# Patient Record
Sex: FEMALE | Race: WHITE | NOT HISPANIC OR LATINO | Employment: UNEMPLOYED | URBAN - METROPOLITAN AREA
[De-identification: names, ages, dates, MRNs, and addresses within clinical notes are randomized per-mention and may not be internally consistent; named-entity substitution may affect disease eponyms.]

---

## 2021-06-17 ENCOUNTER — TRANSFERRED RECORDS (OUTPATIENT)
Dept: HEALTH INFORMATION MANAGEMENT | Facility: CLINIC | Age: 46
End: 2021-06-17

## 2021-07-06 ENCOUNTER — TRANSFERRED RECORDS (OUTPATIENT)
Dept: HEALTH INFORMATION MANAGEMENT | Facility: CLINIC | Age: 46
End: 2021-07-06

## 2022-11-08 ENCOUNTER — TRANSFERRED RECORDS (OUTPATIENT)
Dept: HEALTH INFORMATION MANAGEMENT | Facility: CLINIC | Age: 47
End: 2022-11-08

## 2023-01-09 LAB
ALT SERPL-CCNC: 17 U/L (ref 4–35)
AST SERPL-CCNC: 20 U/L (ref 14–41)
CREATININE (EXTERNAL): 0.7 MG/DL (ref 0.52–1.04)
GFR ESTIMATED (EXTERNAL): 90 ML/MIN/1.73M2 (ref 61–166)
GLUCOSE (EXTERNAL): 97 MG/DL (ref 74–106)
POTASSIUM (EXTERNAL): 4.1 MMOL/L (ref 3.5–5.1)

## 2023-03-31 LAB
ALT SERPL-CCNC: 25 U/L (ref 4–35)
AST SERPL-CCNC: 19 U/L (ref 14–41)
CREATININE (EXTERNAL): 0.6 MG/DL (ref 0.52–1.04)
GFR ESTIMATED (EXTERNAL): 107 ML/MIN/1.73M2 (ref 61–166)
GLUCOSE (EXTERNAL): 98 MG/DL (ref 74–106)
POTASSIUM (EXTERNAL): 4.2 MMOL/L (ref 3.5–5.1)

## 2023-06-23 LAB
ALT SERPL-CCNC: 18 U/L (ref 4–35)
AST SERPL-CCNC: 18 U/L (ref 14–41)
CREATININE (EXTERNAL): 0.7 MG/DL (ref 0.52–1.04)
GFR ESTIMATED (EXTERNAL): 90 ML/MIN/1.73M2 (ref 61–166)
GLUCOSE (EXTERNAL): 100 MG/DL (ref 74–106)
POTASSIUM (EXTERNAL): 4 MMOL/L (ref 3.5–5.1)

## 2023-06-26 ENCOUNTER — TRANSFERRED RECORDS (OUTPATIENT)
Dept: HEALTH INFORMATION MANAGEMENT | Facility: CLINIC | Age: 48
End: 2023-06-26

## 2023-07-17 ENCOUNTER — TRANSFERRED RECORDS (OUTPATIENT)
Dept: HEALTH INFORMATION MANAGEMENT | Facility: CLINIC | Age: 48
End: 2023-07-17

## 2023-09-29 ENCOUNTER — TRANSFERRED RECORDS (OUTPATIENT)
Dept: HEALTH INFORMATION MANAGEMENT | Facility: CLINIC | Age: 48
End: 2023-09-29

## 2023-09-29 LAB
ALT SERPL-CCNC: 19 U/L (ref 4–35)
AST SERPL-CCNC: 19 U/L (ref 14–41)
CREATININE (EXTERNAL): 0.7 MG/DL (ref 0.52–1.04)
GFR ESTIMATED (EXTERNAL): 90 ML/MIN/1.73M2 (ref 61–166)
GLUCOSE (EXTERNAL): 116 MG/DL (ref 74–106)
POTASSIUM (EXTERNAL): 4.1 MMOL/L (ref 3.5–5.1)

## 2023-10-03 ENCOUNTER — TRANSFERRED RECORDS (OUTPATIENT)
Dept: HEALTH INFORMATION MANAGEMENT | Facility: CLINIC | Age: 48
End: 2023-10-03

## 2023-12-13 NOTE — PROGRESS NOTES
"Patient establishing care moving from [place]. Did not transfer any information over here yet or transfer oncology care.      Assessment & Plan     Malignant neoplasm of right female breast, unspecified estrogen receptor status, unspecified site of breast (H)  2 years since diagnosis   - MA Diagnostic Digital Bilateral; Future  - US Breast Bilateral Limited 1-3 Quadrants; Future  - Adult Oncology/Hematology  Referral; Future  Lumpectomy with chemo/rad treatment per patient. Unable to view subtype of cancer vs complete plan without notes although on tamoxifen x 5 years.  Will refer to oncology to get set up with new team in [place]  Set up for repeat mammo plus US in meantime    Screen for colon cancer  Due for screen  - Colonoscopy Screening  Referral; Future    Moderate episode of recurrent major depressive disorder (H)  Ongoing, stable   - citalopram (CELEXA) 40 MG tablet; TAKE 1 TABLET BY MOUTH DAILY*  Refilled medication    Migraine without aura and without status migrainosus, not intractable  Ongoing, stable   - rizatriptan (MAXALT) 10 MG tablet; TAKE 1 TABLET AT ONSET OF HEADACHE*  Refilled medication    Mild intermittent asthma, unspecified whether complicated  Ongoing, stable   - VENTOLIN  (90 Base) MCG/ACT inhaler; INHALE TWO PUFFS BY MOUTH EVERY 4 HOURS  - FLOVENT  MCG/ACT inhaler; Inhale 1 puff into the lungs 2 times daily  Refilled    Legally blind  Pt does not drive    Pt to get pap smear completed at later date- needs yearly for tamoxifen             Nicotine/Tobacco Cessation:  She reports that she has been smoking cigarettes. She has never used smokeless tobacco.  Nicotine/Tobacco Cessation Plan:   Self help information given to patient      BMI:   Estimated body mass index is 38.44 kg/m  as calculated from the following:    Height as of this encounter: 1.6 m (5' 3\").    Weight as of this encounter: 98.4 kg (217 lb).   Weight management plan: Discussed healthy diet and " "exercise guidelines    Follow up for pap, prevent visit     DOLLY BYRD [facility]      Florentin Daigle is a 48 year old, presenting for the following health issues:  Establish Care and Recheck Medication    Legally blind. Doesn't drive. Lumpectomy  breast cancer. 2 years ago? With invasion of lymph node(s)    Asthma and current smoker          12/20/2023     1:49 PM   Additional Questions   Roomed by James Ramirez MA   Accompanied by Self       History of Present Illness       Reason for visit:  Check up    She eats 2-3 servings of fruits and vegetables daily.She consumes 1 sweetened beverage(s) daily.She exercises with enough effort to increase her heart rate 30 to 60 minutes per day.  She exercises with enough effort to increase her heart rate 4 days per week.   She is taking medications regularly.           Review of Systems   Constitutional, HEENT, cardiovascular, pulmonary, GI, , musculoskeletal, neuro, skin, endocrine and psych systems are negative, except as otherwise noted.          Objective    /72   Pulse 84   Temp 98.1  F (36.7  C) (Tympanic)   Resp 14   Ht 1.6 m (5' 3\")   Wt 98.4 kg (217 lb)   SpO2 97%   Breastfeeding No   BMI 38.44 kg/m    Body mass index is 38.44 kg/m .      Physical Exam   GENERAL: healthy, alert and no distress  EYES: Eyes grossly normal to inspection, PERRL and conjunctivae and sclerae normal  HENT: ear canals and TM's normal, nose and mouth without ulcers or lesions  NECK: no adenopathy, no asymmetry, masses, or scars and thyroid normal to palpation  RESP: lungs clear to auscultation - no rales, rhonchi or wheezes  CV: regular rate and rhythm, normal S1 S2, no S3 or S4, no murmur, click or rub, no peripheral edema and peripheral pulses strong  ABDOMEN: soft, nontender, no hepatosplenomegaly, no masses and bowel sounds normal  MS: no gross musculoskeletal defects noted, no edema  SKIN: no suspicious lesions or rashes  PSYCH: " mentation appears normal, affect normal/bright

## 2023-12-20 ENCOUNTER — OFFICE VISIT (OUTPATIENT)
Dept: FAMILY MEDICINE | Facility: CLINIC | Age: 48
End: 2023-12-20
Payer: COMMERCIAL

## 2023-12-20 ENCOUNTER — PATIENT OUTREACH (OUTPATIENT)
Dept: ONCOLOGY | Facility: CLINIC | Age: 48
End: 2023-12-20

## 2023-12-20 VITALS
BODY MASS INDEX: 38.45 KG/M2 | WEIGHT: 217 LBS | TEMPERATURE: 98.1 F | HEIGHT: 63 IN | DIASTOLIC BLOOD PRESSURE: 72 MMHG | SYSTOLIC BLOOD PRESSURE: 117 MMHG | RESPIRATION RATE: 14 BRPM | OXYGEN SATURATION: 97 % | HEART RATE: 84 BPM

## 2023-12-20 DIAGNOSIS — H54.8 LEGALLY BLIND: ICD-10-CM

## 2023-12-20 DIAGNOSIS — C50.911 MALIGNANT NEOPLASM OF RIGHT FEMALE BREAST, UNSPECIFIED ESTROGEN RECEPTOR STATUS, UNSPECIFIED SITE OF BREAST (H): Primary | ICD-10-CM

## 2023-12-20 DIAGNOSIS — G43.009 MIGRAINE WITHOUT AURA AND WITHOUT STATUS MIGRAINOSUS, NOT INTRACTABLE: ICD-10-CM

## 2023-12-20 DIAGNOSIS — Z12.11 SCREEN FOR COLON CANCER: ICD-10-CM

## 2023-12-20 DIAGNOSIS — F33.1 MODERATE EPISODE OF RECURRENT MAJOR DEPRESSIVE DISORDER (H): ICD-10-CM

## 2023-12-20 DIAGNOSIS — J45.20 MILD INTERMITTENT ASTHMA, UNSPECIFIED WHETHER COMPLICATED: ICD-10-CM

## 2023-12-20 PROCEDURE — 99204 OFFICE O/P NEW MOD 45 MIN: CPT | Performed by: PHYSICIAN ASSISTANT

## 2023-12-20 RX ORDER — CITALOPRAM HYDROBROMIDE 40 MG/1
TABLET ORAL
Qty: 90 TABLET | Refills: 1 | Status: SHIPPED | OUTPATIENT
Start: 2023-12-20 | End: 2024-07-31

## 2023-12-20 RX ORDER — CITALOPRAM HYDROBROMIDE 40 MG/1
TABLET ORAL
COMMUNITY
Start: 2023-12-06 | End: 2023-12-20

## 2023-12-20 RX ORDER — DEXAMETHASONE 4 MG/1
1 TABLET ORAL 2 TIMES DAILY
COMMUNITY
Start: 2023-10-11 | End: 2023-12-20

## 2023-12-20 RX ORDER — DEXAMETHASONE 4 MG/1
1 TABLET ORAL 2 TIMES DAILY
Qty: 12 G | Refills: 11 | Status: SHIPPED | OUTPATIENT
Start: 2023-12-20

## 2023-12-20 RX ORDER — RIZATRIPTAN BENZOATE 10 MG/1
TABLET ORAL
COMMUNITY
Start: 2023-12-06 | End: 2023-12-20

## 2023-12-20 RX ORDER — TAMOXIFEN CITRATE 20 MG/1
TABLET ORAL
COMMUNITY
Start: 2023-12-06 | End: 2024-02-01

## 2023-12-20 RX ORDER — RIZATRIPTAN BENZOATE 10 MG/1
TABLET ORAL
Qty: 30 TABLET | Refills: 3 | Status: SHIPPED | OUTPATIENT
Start: 2023-12-20 | End: 2024-01-12

## 2023-12-20 RX ORDER — ALBUTEROL SULFATE 90 UG/1
AEROSOL, METERED RESPIRATORY (INHALATION)
COMMUNITY
Start: 2023-10-11 | End: 2023-12-20

## 2023-12-20 RX ORDER — ALBUTEROL SULFATE 90 UG/1
AEROSOL, METERED RESPIRATORY (INHALATION)
Qty: 18 G | Refills: 4 | Status: SHIPPED | OUTPATIENT
Start: 2023-12-20 | End: 2024-07-31

## 2023-12-20 ASSESSMENT — PAIN SCALES - GENERAL: PAINLEVEL: NO PAIN (0)

## 2023-12-20 NOTE — PROGRESS NOTES
New Patient Oncology Nurse Navigator Note     Referring provider: Marquez Aguayo PA-C     Referring Clinic/Organization: [facility]     Referred to (specialty:) Medical Oncology      Date Referral Received: December 20, 2023     Evaluation for:  C50.911 (ICD-10-CM) - Malignant neoplasm of right female breast, unspecified estrogen receptor status, unspecified site of breast (H)     Clinical History (per Nurse review of records provided):      Oxana is a 48 year old female who recently relocated from [place], [place] to [place].  She met with Dr. Aguayo  of [facility] on 12/20/23.     Patient is s/p right lumpectomy, adjuvant chemotherapy, and radiation and has been taking tamoxifen (original plan tamoxifen for 5-7 years). She states all of her oncology care was at [facility] in [place], [place].    6/17/21 - Diagnostic right breast imaging was performed after patient presented with a palpable lump in right breast. A spiculated 15 mm mass was identified within the upper outer aspect of the right breast posteriorly corresponding to palpable abnormality. Targeted ultrasound revealed a hypoechoic irregularly marginated shadowing 17 mm mass at the 11:00 position which appeared to correspond to mammographic and palpable abnormality.     Oncotype was performed on [id number] and RS = 23.  Oncotype report needed    6/17/21 - [id number]  HER2 by FISH negative      7/6/21 - [id number]  Right breast lumpectomy and sentinel lymph node biopsy  IDC, Gr 2, 1.8 cm,    DCIS  One lymph node positive for metastatic disease and 6 lymph nodes negative for metastatic disease.  Dr. Juan Miguel Hernandes was her medical oncologist in [place] initially.     Cytoxan and Taxotere every 3 weeks  4 cycles started 8/26/21 completed on 10/28/21.    Adjuvant radiation therapy completed 12/21/2021.    Tamoxifen started 3/29/2022.     She states her [place] provider had recently felt a lump, but no imaging  was performed prior to her moving to [place] (ANDREINA Turner on documents). Dr. Aguayo has ordered bilateral diagnostic breast imaging scheduled for 1/3/24.     Last breast imaging appears to have been bilateral screening mammogram with tomosynthesis on 7/17/23 at [facility].     12/21 10:37 - Telephoned and spoke with Oxana to get an overview of her care and treatment after breast cancer diagnosis. She was scheduled for diagnostic breast imaging on 1/3 but that was rescheduled to 1/10 due to diagnosis of conjunctivitis.     1/10/24 - Right diagnostic mammogram and ultrasound  Mammogram: Posttreatment changes of right breast conservation therapy. No suspicious findings in either breast.  Ultrasound:  Targeted right breast ultrasound evaluation was performed by the technologist and radiologist. In the area of the palpable lump in the upper outer quadrant, there is scar tissue without suspicious abnormality.  IMPRESSION: BI-RADS CATEGORY: 2 - Benign.  RECOMMENDED FOLLOW-UP: Annual Mammography.    1/12 11:38 - Telephoned and spoke with Oxana. Explained my role and purpose for the call. Writer received referral, reviewed for appropriate plan, and call warm transferred to New Patient Scheduling for completion.      Patient resides in [place], [place].     Records Location: Care Everywhere, Media, and See Bookmarked material     Records Needed:  Path reports-biopsy and surgery (as applicable)  Pathology reviews (as applicable)  Med onc notes, rad notes, OP note, surgeons note (as applicable)  Genetic results (as applicable)  Echo or MUGA results (as applicable)  Radiation summary (as applicable)  Chemotherapy summary(as applicable)  All care was through [place], [place]  Breast imaging for past 5 years (nothing on PACS as of 1/12/24 except for local imaging)

## 2023-12-21 ENCOUNTER — PRE VISIT (OUTPATIENT)
Dept: ONCOLOGY | Facility: CLINIC | Age: 48
End: 2023-12-21
Payer: COMMERCIAL

## 2023-12-21 NOTE — TELEPHONE ENCOUNTER
Action Taken  Date/Description  TJ     N Navigator December 21, 2023  11:45 AM   Referring Provider/Location:  Marquez Aguayo PA-C   Dx and Code: C50.911 (ICD-10-CM) - Malignant neoplasm of right female breast, unspecified estrogen receptor status, unspecified site of breast (H)   Pt originally Diagnosed at [facility] in 2021; Imaging and all records here.  Pt stated she had Sx, Bx, Chemo and Radiation, all at this facility and that there are no other outside records.  Per Pt, send DYLLAN to her spouse Emmanuel's email:  [email or web address]   Sent while on the phone and confirmed receipt.  Call to [facility] and xferred to medical records.  Recording states F:  [phone]    December 22, 2023  Signed DYLLAN received from Pt and faxed to HIM for upload and faxed to [facility]  January 18, 2024  Message from Adam stating all recs received     Records Requested     Clinic name Comments/Action Taken   [facility] December 21, 2023 12:04 PM    Faxed request for all records.  Noted to call and provide address/shipping info for imaging and pathology.     [facility] December 21, 2023 12:25 PM  Faxed request for all Radiation Oncology Treatment Records.   Added to [facility] Spreadsheet  January 9, 2024  Re-faxed request to [phone]     Action December 22, 2023 7:07 AM TJ   Incoming Records Signed DYLLAN received from Pt; Faxed to HIM for upload and to [facility]     Action December 26, 2023 1:37 PM TJ   Incoming Records Records received from [facility] and sent to HIM Urgent for upload.  Emailed to NN  December 28, 2023  Additional records received from [facility] and sent to [facility] Urgent for upload.  Emailed to NN     Action January 9, 2024 8:41 AM TJ   Incoming Records January 9, 2024  Email with rad onc recs/caleb code received and sent to Adam in Rad Onc.  I cannot see the 2023 spreadsheet to  determine status on their end.  8:59 AM  Message from Adam that no colored docs have been received.  Called [facility] and they have asked me to refax request and instructions to [phone]

## 2023-12-31 ENCOUNTER — HEALTH MAINTENANCE LETTER (OUTPATIENT)
Age: 48
End: 2023-12-31

## 2024-01-02 ENCOUNTER — TELEPHONE (OUTPATIENT)
Dept: FAMILY MEDICINE | Facility: CLINIC | Age: 49
End: 2024-01-02

## 2024-01-02 ENCOUNTER — E-VISIT (OUTPATIENT)
Dept: URGENT CARE | Facility: CLINIC | Age: 49
End: 2024-01-02
Payer: COMMERCIAL

## 2024-01-02 DIAGNOSIS — H10.30 ACUTE BACTERIAL CONJUNCTIVITIS, UNSPECIFIED LATERALITY: Primary | ICD-10-CM

## 2024-01-02 PROCEDURE — 99421 OL DIG E/M SVC 5-10 MIN: CPT | Performed by: EMERGENCY MEDICINE

## 2024-01-02 RX ORDER — OFLOXACIN 3 MG/ML
SOLUTION/ DROPS OPHTHALMIC
Qty: 5 ML | Refills: 0 | Status: SHIPPED | OUTPATIENT
Start: 2024-01-02 | End: 2024-01-09

## 2024-01-02 NOTE — TELEPHONE ENCOUNTER
Patient reports that she was diagnosed with pink eye and she has a mammogram with ultra sound tomorrow to follow up on her breast cancer.  She is asking if she should come to that appointment.    Nursing support:  She was advised to call imaging scheduling (given the phone number) and see how they would like to handle it.  If they need to cancel they can help her reschedule also. Patient verbalized good understanding, agrees with plan and needs no further support.  Thank you. Soledad Davies R.N.

## 2024-01-02 NOTE — PATIENT INSTRUCTIONS
Pinkeye: Care Instructions  Overview     Pinkeye is redness and swelling of the eye surface and the conjunctiva (the lining of the eyelid and the covering of the white part of the eye). Pinkeye is also called conjunctivitis. Pinkeye is often caused by infection with bacteria or a virus. Dry air, allergies, smoke, and chemicals are other common causes.  Pinkeye often gets better on its own in 7 to 10 days. Antibiotics only help if the pinkeye is caused by bacteria. Pinkeye caused by infection spreads easily. If an allergy or chemical is causing pinkeye, it will not go away unless you can avoid whatever is causing it.  Follow-up care is a key part of your treatment and safety. Be sure to make and go to all appointments, and call your doctor if you are having problems. It's also a good idea to know your test results and keep a list of the medicines you take.  How can you care for yourself at home?    Wash your hands often. Always wash them before and after you treat pinkeye or touch your eyes or face.    Use moist cotton or a clean, wet cloth to remove crust. Wipe from the inside corner of the eye to the outside. Use a clean part of the cloth for each wipe.    Put cold or warm wet cloths on your eye a few times a day if the eye hurts.    Do not wear contact lenses or eye makeup until the pinkeye is gone. Throw away any eye makeup you were using when you got pinkeye. Clean your contacts and storage case. If you wear disposable contacts, use a new pair when your eye has cleared and it is safe to wear contacts again.    If the doctor gave you antibiotic ointment or eyedrops, use them as directed. Use the medicine for as long as instructed, even if your eye starts looking better soon. Keep the bottle tip clean, and do not let it touch the eye area.    To put in eyedrops or ointment:  ? Tilt your head back, and pull your lower eyelid down with one finger.  ? Drop or squirt the medicine inside the lower lid.  ? Close your  "eye for 30 to 60 seconds to let the drops or ointment move around.  ? Do not touch the ointment or dropper tip to your eyelashes or any other surface.    Do not share towels, pillows, or washcloths while you have pinkeye.  When should you call for help?   Call your doctor now or seek immediate medical care if:      You have pain in your eye, not just irritation on the surface.       You have a change in vision or loss of vision.       You have an increase in discharge from the eye.       Your eye has not started to improve or begins to get worse within 48 hours after you start using antibiotics.       Pinkeye lasts longer than 7 days.   Watch closely for changes in your health, and be sure to contact your doctor if you have any problems.  Where can you learn more?  Go to https://www.[employer]/patiented  Enter Y392 in the search box to learn more about \"Pinkeye: Care Instructions.\"  Current as of: June 6, 2023               Content Version: 13.8 2006-2023 [employer].   Care instructions adapted under license by your healthcare professional. If you have questions about a medical condition or this instruction, always ask your healthcare professional. [employer] disclaims any warranty or liability for your use of this information.       Taking Care of Pinkeye at Home (01:30)  Your health professional recommends that you watch this short online health video.  Learn ways to ease the discomfort of pinkeye and keep the infection from spreading.  Purpose:  Describes basic home care for pinkeye to ease discomfort and prevent the spread of the infection.  Goal:  User will learn home treatment for pinkeye.     How to watch the video    Scan the QR code   OR Visit the website    https://link.[employer]/r/Bvycmn53qonwl   Current as of: June 6, 2023               Content Version: 13.8 2006-2023 [employer].   Care instructions adapted under license by your healthcare " professional. If you have questions about a medical condition or this instruction, always ask your healthcare professional. [employer] disclaims any warranty or liability for your use of this information.

## 2024-01-08 ENCOUNTER — TELEPHONE (OUTPATIENT)
Dept: GASTROENTEROLOGY | Facility: CLINIC | Age: 49
End: 2024-01-08
Payer: COMMERCIAL

## 2024-01-08 NOTE — TELEPHONE ENCOUNTER
"Endoscopy Scheduling Screen    Have you had a positive Covid test in the last 14 days?  No    Are you active on MyChart?   Yes    What insurance is in the chart?  Other:  [facility]    Ordering/Referring Provider: MITCHEL BUNDY   (If ordering provider performs procedure, schedule with ordering provider unless otherwise instructed. )    BMI: Estimated body mass index is 38.44 kg/m  as calculated from the following:    Height as of 12/20/23: 1.6 m (5' 3\").    Weight as of 12/20/23: 98.4 kg (217 lb).     Sedation Ordered  moderate sedation.   If patient BMI > 50 do not schedule in [facility].    If patient BMI > 45 do not schedule at [facility].    Are you taking methadone or Suboxone?  No    Are you taking any prescription medications for pain 3 or more times per week?   NO - No RN review required.    Do you have a history of malignant hyperthermia or adverse reaction to anesthesia?  No    (Females) Are you currently pregnant?   No     Have you been diagnosed or told you have pulmonary hypertension?   No    Do you have an LVAD?  No    Have you been told you have moderate to severe sleep apnea?  No    Have you been told you have COPD, asthma, or any other lung disease?  Yes     What breathing problems do you have?  Asthma     Do you use home oxygen?  No    Have your breathing problems required an ED visit or hospitalization in the last year?  No    Do you have any heart conditions?  No     Have you ever had an organ transplant?   No    Have you ever had or are you awaiting a heart or lung transplant?   No    Have you had a stroke or transient ischemic attack (TIA aka \"mini stroke\" in the last 6 months?   No    Have you been diagnosed with or been told you have cirrhosis of the liver?   No    Are you currently on dialysis?   No    Do you need assistance transferring?   No    BMI: Estimated body mass index is 38.44 kg/m  as calculated from the following:    Height as of 12/20/23: 1.6 m (5' 3\").    Weight as of 12/20/23: 98.4 kg (217 lb). "     Is patients BMI > 40 and scheduling location [facility]?  No    Do you take an injectable medication for weight loss or diabetes (excluding insulin)?  No    Do you take the medication Naltrexone?  No    Do you take blood thinners?  No       Prep   Are you currently on dialysis or do you have chronic kidney disease?  No    Do you have a diagnosis of diabetes?  No    Do you have a diagnosis of cystic fibrosis (CF)?  No    On a regular basis do you go 3 -5 days between bowel movements?  No    BMI > 40?  No    Preferred Pharmacy:    [id number] - [place], [place] - [address]  [place] [address] [place] [address]  Phone: [phone] Fax: [phone]      Final Scheduling Details   Colonoscopy prep sent?  Standard MiraLAX    Procedure scheduled  Colonoscopy    Surgeon:  BHAVESH     Date of procedure:  2/20/24     Pre-OP / PAC:   No - Not required for this site.    Location  [facility] - [facility] - Per order.    Sedation   Moderate Sedation - Per order.      Patient Reminders:   You will receive a call from a Nurse to review instructions and health history.  This assessment must be completed prior to your procedure.  Failure to complete the Nurse assessment may result in the procedure being cancelled.      On the day of your procedure, please designate an adult(s) who can drive you home stay with you for the next 24 hours. The medicines used in the exam will make you sleepy. You will not be able to drive.      You cannot take public transportation, ride share services, or non-medical taxi service without a responsible caregiver.  Medical transport services are allowed with the requirement that a responsible caregiver will receive you at your destination.  We require that drivers and caregivers are confirmed prior to your procedure.

## 2024-01-10 ENCOUNTER — ANCILLARY PROCEDURE (OUTPATIENT)
Dept: ULTRASOUND IMAGING | Facility: CLINIC | Age: 49
End: 2024-01-10
Attending: PHYSICIAN ASSISTANT
Payer: COMMERCIAL

## 2024-01-10 ENCOUNTER — ANCILLARY PROCEDURE (OUTPATIENT)
Dept: MAMMOGRAPHY | Facility: CLINIC | Age: 49
End: 2024-01-10
Attending: PHYSICIAN ASSISTANT
Payer: COMMERCIAL

## 2024-01-10 DIAGNOSIS — C50.911 MALIGNANT NEOPLASM OF RIGHT FEMALE BREAST, UNSPECIFIED ESTROGEN RECEPTOR STATUS, UNSPECIFIED SITE OF BREAST (H): ICD-10-CM

## 2024-01-10 PROCEDURE — G0279 TOMOSYNTHESIS, MAMMO: HCPCS

## 2024-01-10 PROCEDURE — 76642 ULTRASOUND BREAST LIMITED: CPT | Mod: RT

## 2024-01-10 PROCEDURE — 77066 DX MAMMO INCL CAD BI: CPT

## 2024-01-12 ENCOUNTER — TELEPHONE (OUTPATIENT)
Dept: FAMILY MEDICINE | Facility: CLINIC | Age: 49
End: 2024-01-12
Payer: COMMERCIAL

## 2024-01-12 DIAGNOSIS — G43.009 MIGRAINE WITHOUT AURA AND WITHOUT STATUS MIGRAINOSUS, NOT INTRACTABLE: ICD-10-CM

## 2024-01-12 RX ORDER — RIZATRIPTAN BENZOATE 10 MG/1
TABLET ORAL
Qty: 30 TABLET | Refills: 3 | Status: SHIPPED | OUTPATIENT
Start: 2024-01-12

## 2024-01-12 NOTE — TELEPHONE ENCOUNTER
Not going to change script to say how many headaches per month I don't know and that is not standard. Other script I can and did update for them as it did not pull over correctly the first time.     MITCHEL BUNDY PA-C

## 2024-01-12 NOTE — TELEPHONE ENCOUNTER
Please clarify sig and resend. Need max per day/max per month and approx # of headaches per month. (RE: Rizatriptan Benzoate)

## 2024-01-22 NOTE — TELEPHONE ENCOUNTER
Action January 29, 2024 2:08 PM ABT   Action Taken Called [facility] [phone], unable to speak to team, LVM for call back for fax number     Action 01/24/24  AV 11:03 AM    Action Taken  Disks from [facility] received, brought to [facility] 2nd floor to upload.       RECORDS STATUS - BREAST    RECORDS REQUESTED FROM:   Appt Date: 1/31/2024    Malignant neoplasm of right female breast, unspecified estrogen receptor status, unspecified site of   Breast  Action    Action Taken 1/26/2024 8:26AM KEB     I called [facility] again Ph: [phone]- the phone continued to ring.. (stay on the line) - I was transferred to the path dept. I left another detailed vm for Teri. I asked her to call back or send a fax with all their contact info including the pt's biopsy case #.      Date/Time  Action Taken/Description  TJ   January 31, 2024 2:53 PM Call to [facility] to attempt getting info for path slides. Xferred to pathology, phone continued to ring with no answer.  IB to NN   January 24, 2024  8:56 AM    Call to [facility] for Teri in Pathology to CB and provide info for path slides     Action    Action Taken 1/22/2024 3:18pm CHAVA     I called [facility] in [place] [phone]      NOTES DETAILS STATUS   OFFICE NOTE from referring provider  referred by ANDREINA Aguayo   OFFICE NOTE from medical oncologist External: [place] 06/23/23: Amaris Mcpherson NP  10/11/22: ANDREINA Davis  11/30/21: Dr. Hari Mason   OFFICE NOTE from surgeon External: [place] Dr. Jeffrey A. Kreyer   OFFICE NOTE from radiation oncologist     DISCHARGE SUMMARY from hospital     DISCHARGE REPORT from the ER     OPERATIVE REPORT External: [place] 07/06/21: Right sentinel lymph node biopsy and right lumectomy   MEDICATION LIST     LABS     REQUEST BLOCKS FOR ALL BREAST CANCER PTS     PATHOLOGY REPORTS  (Tissue diagnosis, Stage, ER/CA percentage positive and intensity of staining,  HER2 IHC, FISH, and all biopsies from breast and any distant metastasis)                 [facility] 07/06/21: [id number]  06/17/21: [id number]   IMAGING (NEED IMAGES & REPORT)     MAMMO PACS 01/10/24-06/17/21   ULTRASOUND PACS US Breast 1/10/2024-06/17/2021

## 2024-01-31 ENCOUNTER — ONCOLOGY VISIT (OUTPATIENT)
Dept: ONCOLOGY | Facility: CLINIC | Age: 49
End: 2024-01-31
Attending: PHYSICIAN ASSISTANT
Payer: COMMERCIAL

## 2024-01-31 ENCOUNTER — PRE VISIT (OUTPATIENT)
Dept: ONCOLOGY | Facility: CLINIC | Age: 49
End: 2024-01-31
Payer: COMMERCIAL

## 2024-01-31 VITALS
HEART RATE: 66 BPM | SYSTOLIC BLOOD PRESSURE: 113 MMHG | WEIGHT: 217 LBS | HEIGHT: 63 IN | BODY MASS INDEX: 38.45 KG/M2 | OXYGEN SATURATION: 96 % | DIASTOLIC BLOOD PRESSURE: 69 MMHG

## 2024-01-31 DIAGNOSIS — C50.911 MALIGNANT NEOPLASM OF RIGHT FEMALE BREAST, UNSPECIFIED ESTROGEN RECEPTOR STATUS, UNSPECIFIED SITE OF BREAST (H): ICD-10-CM

## 2024-01-31 DIAGNOSIS — N95.1 SYMPTOMATIC MENOPAUSAL OR FEMALE CLIMACTERIC STATES: ICD-10-CM

## 2024-01-31 DIAGNOSIS — M79.661 PAIN OF RIGHT LOWER LEG: ICD-10-CM

## 2024-01-31 DIAGNOSIS — N95.1 SYMPTOMATIC MENOPAUSAL OR FEMALE CLIMACTERIC STATES: Primary | ICD-10-CM

## 2024-01-31 PROCEDURE — G0463 HOSPITAL OUTPT CLINIC VISIT: HCPCS | Performed by: INTERNAL MEDICINE

## 2024-01-31 PROCEDURE — 82670 ASSAY OF TOTAL ESTRADIOL: CPT | Performed by: INTERNAL MEDICINE

## 2024-01-31 PROCEDURE — 83002 ASSAY OF GONADOTROPIN (LH): CPT | Performed by: INTERNAL MEDICINE

## 2024-01-31 PROCEDURE — 99204 OFFICE O/P NEW MOD 45 MIN: CPT | Performed by: INTERNAL MEDICINE

## 2024-01-31 PROCEDURE — 36415 COLL VENOUS BLD VENIPUNCTURE: CPT | Performed by: INTERNAL MEDICINE

## 2024-01-31 PROCEDURE — 83001 ASSAY OF GONADOTROPIN (FSH): CPT | Performed by: INTERNAL MEDICINE

## 2024-01-31 ASSESSMENT — PAIN SCALES - GENERAL: PAINLEVEL: NO PAIN (0)

## 2024-01-31 NOTE — LETTER
1/31/2024         RE: Oxana Mathias  [address] [facility] [address]        Dear Colleague,    Thank you for referring your patient, Oxana Mathias, to the [facility]. Please see a copy of my visit note below.    Oncology initial visit:  Date on this visit: 1/31/2024    Oxana Mathias  is referred by Dr.Dustin Aguayo for an oncology consultation. She requires evaluation for right-sided breast cancer.      Primary Physician: Marquez Aguayo     History of present illness:      She is 48 year old premenopausal who comes in today to establish care for right-sided breast cancer.    She is here with her .  I have reviewed notes from outside oncologist.  She was found to have a right-sided hormone receptor positive invasive ductal carcinoma of the breast for which she underwent lumpectomy with sentinel lymph node biopsy on 7/6/2021.    It was stage IA ( pT1c pN1, cM0 ) 18 mm, invasive ductal carcinoma of the right breast, grade 2, ER and CO positive.  HER2/alex FISH was negative.  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  Margins were negative.  1 out of 7 lymph node was positive.  Oncotype Dx score of 23 translating into 19% distant recurrence risk at 9 years of endocrine therapy.  8/26/2021 - 10/28/2021.  Adjuvant Cytoxan/Taxotere x 4 cycles  12/21/2021.  Completed adjuvant radiation    3/29/2022.  Started tamoxifen 20 mg daily.      She tells me that overall she is tolerating tamoxifen well.  She does have hot flashes which initially were more bothersome but lately they have not been as bad and now they occur occasionally and not bothering her much.  She denies any abnormal vaginal bleeding or vaginal dryness.  No new lumps bumps or swellings.  She does mention pain in the right leg which has been going on for quite some time now.  Sometimes it seems that it starts in the lower back and then goes down.  No redness or swelling.  No fevers  infections or shortness of breath.  No neuropathy.  She does have some fatigue.  She mentioned that she had normal menstrual period before the start of the chemotherapy but since the chemotherapy she has not had any menstrual bleeding.    ECOG 1    ROS:  A comprehensive ROS was otherwise neg        Past Medical/Surgical History:  No past medical history on file.  No past surgical history on file.  Breast cancer  Asthma  Anxiety    Cancer History:   As above    Allergies:  Allergies as of 01/31/2024     (No Known Allergies)     Current Medications:  Current Outpatient Medications   Medication Sig Dispense Refill     citalopram (CELEXA) 40 MG tablet TAKE 1 TABLET BY MOUTH DAILY* 90 tablet 1     FLOVENT  MCG/ACT inhaler Inhale 1 puff into the lungs 2 times daily 12 g 11     rizatriptan (MAXALT) 10 MG tablet TAKE 1 TABLET AT ONSET OF HEADACHE. Can repeat dose 4 hours later if needed. Max 3/24 hours. 30 tablet 3     tamoxifen (NOLVADEX) 20 MG tablet TAKE 1 TABLET BY MOUTH DAILY*       VENTOLIN  (90 Base) MCG/ACT inhaler INHALE TWO PUFFS BY MOUTH EVERY 4 HOURS 18 g 4      Family History:  No family history on file.  Maternal grand mother with leukemia  Paternal uncle multiple myeloma  Paternal aunt with breast cancer  She has 4 children- youngest has asthma    Social History:  Social History     Socioeconomic History     Marital status:      Spouse name: Not on file     Number of children: Not on file     Years of education: Not on file     Highest education level: Not on file   Occupational History     Not on file   Tobacco Use     Smoking status: Every Day     Types: Cigarettes     Smokeless tobacco: Never   Vaping Use     Vaping Use: Never used   Substance and Sexual Activity     Alcohol use: Not on file     Drug use: Not on file     Sexual activity: Not on file   Other Topics Concern     Not on file   Social History Narrative     Not on file     Social Determinants of Health     Financial Resource  "Strain: Low Risk  (12/20/2023)    Financial Resource Strain      Within the past 12 months, have you or your family members you live with been unable to get utilities (heat, electricity) when it was really needed?: No   Food Insecurity: Low Risk  (12/20/2023)    Food Insecurity      Within the past 12 months, did you worry that your food would run out before you got money to buy more?: No      Within the past 12 months, did the food you bought just not last and you didn t have money to get more?: No   Transportation Needs: Low Risk  (12/20/2023)    Transportation Needs      Within the past 12 months, has lack of transportation kept you from medical appointments, getting your medicines, non-medical meetings or appointments, work, or from getting things that you need?: No   Physical Activity: Not on file   Stress: Not on file   Social Connections: Not on file   Interpersonal Safety: Low Risk  (12/20/2023)    Interpersonal Safety      Do you feel physically and emotionally safe where you currently live?: Yes      Within the past 12 months, have you been hit, slapped, kicked or otherwise physically hurt by someone?: No      Within the past 12 months, have you been humiliated or emotionally abused in other ways by your partner or ex-partner?: No   Housing Stability: Low Risk  (12/20/2023)    Housing Stability      Do you have housing? : Yes      Are you worried about losing your housing?: No     Smokes on average 1/4 ppd. Used to smoke a ppd x 23 years. No etoh use. Lives with . Moved from [place] to [place] because son lives here      Physical Exam:  /69 (BP Location: Left arm, Patient Position: Chair, Cuff Size: Adult Regular)   Pulse 66   Ht 1.6 m (5' 3\")   Wt 98.4 kg (217 lb)   SpO2 96%   BMI 38.44 kg/m      Wt Readings from Last 4 Encounters:   01/31/24 98.4 kg (217 lb)   12/20/23 98.4 kg (217 lb)     CONSTITUTIONAL: no acute distress  EYES: PERRLA, no palor or icterus.   ENT/MOUTH: no mouth lesions. Ears " normal  CVS: s1s2 no m r g .   RESPIRATORY: clear to auscultation b/l  GI: soft non tender no hepatosplenomegaly  NEURO: AAOX3  Grossly non focal neuro exam  INTEGUMENT: no obvious rashes  LYMPHATIC: no palpable cervical, supraclavicular, axillary or inguinal LAD  MUSCULOSKELETAL: Unremarkable. No bony tenderness.   EXTREMITIES: no edema  PSYCH: Mentation, mood and affect are normal. Decision making capacity is intact  Breast exam.  Done in presence of female chaperone.  On the right side surgical scars are well-healed.  No concerning palpable abnormality.  Left breast exam was unremarkable.    Laboratory/Imaging Studies      Reviewed    9/29/2023  CBC unremarkable  CMP unremarkable      1/10/2024.  Bilateral diagnostic mammogram and ultrasound of right breast showed unremarkable findings and no change from mammogram dated 7/17/2023.    ASSESSMENT/PLAN:      She is a s/p lumpectomy and sentinel lymph node biopsy in July 2021 for STAGE IA ( pT1c pN1, cM0 ) 18 mm, invasive ductal carcinoma of the right breast, grade 2, ER and GA positive.  HER2/alex FISH was negative.  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  Margins were negative.  1 out of 7 lymph node was positive.  Oncotype Dx score of 23 translating into 19% distant recurrence risk at 9 years of endocrine therapy.  8/26/2021 - 10/28/2021.  Adjuvant Cytoxan/Taxotere x 4 cycles  12/21/2021.  Completed adjuvant radiation    Started tamoxifen in March 2022.    Currently no evidence of recurrence and she is tolerating tamoxifen fairly well.    Plan to repeat bilateral mammogram in January 2025.    She will continue tamoxifen for now but since she has not had a menstrual period for about 2 years, I would recommend checking FSH LH and estradiol to see if she is in menopause.  If she is not menopausal then we will switch to Arimidex.  We discussed the rational schedule and potential side effects of it in detail.    We will also do DEXA scan if we switch her  to Arimidex.  She should be on calcium and vitamin D.      Hot flashes.  She gets hot flashes with tamoxifen but these are not very bothersome to her at this time it is reasonable to observe.    She should annual GYN exam while taking tamoxifen.    Right leg pain.  I am not exactly sure about the cause but we will check ultrasound of the right lower extremity to rule out DVT.  If it is unremarkable then she should follow with PCP because it is a possibility it could be coming from the lower back.    Discussion regarding exercise.  I recommend at least 150 minutes of aerobic weightbearing exercise weekly.    Smoking.  Congratulated her on her efforts on cutting down on smoking and encouraged her to keep working hard to completely stop smoking in the near future    We will determine the follow-up with me after reviewing labs as mentioned above.    All questions answered.  She is agreeable and comfortable with the plan.    China Frederick MD       Again, thank you for allowing me to participate in the care of your patient.        Sincerely,        China Frederick MD

## 2024-01-31 NOTE — PROGRESS NOTES
Oncology initial visit:  Date on this visit: 1/31/2024    Oxana Mathias  is referred by Dr.Dustin Aguayo for an oncology consultation. She requires evaluation for right-sided breast cancer.      Primary Physician: Marquez Aguayo     History of present illness:      She is 48 year old premenopausal who comes in today to establish care for right-sided breast cancer.    She is here with her .  I have reviewed notes from outside oncologist.  She was found to have a right-sided hormone receptor positive invasive ductal carcinoma of the breast for which she underwent lumpectomy with sentinel lymph node biopsy on 7/6/2021.    It was stage IA ( pT1c pN1, cM0 ) 18 mm, invasive ductal carcinoma of the right breast, grade 2, ER and IL positive.  HER2/alex FISH was negative.  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  Margins were negative.  1 out of 7 lymph node was positive.  Oncotype Dx score of 23 translating into 19% distant recurrence risk at 9 years of endocrine therapy.  8/26/2021 - 10/28/2021.  Adjuvant Cytoxan/Taxotere x 4 cycles  12/21/2021.  Completed adjuvant radiation    3/29/2022.  Started tamoxifen 20 mg daily.      She tells me that overall she is tolerating tamoxifen well.  She does have hot flashes which initially were more bothersome but lately they have not been as bad and now they occur occasionally and not bothering her much.  She denies any abnormal vaginal bleeding or vaginal dryness.  No new lumps bumps or swellings.  She does mention pain in the right leg which has been going on for quite some time now.  Sometimes it seems that it starts in the lower back and then goes down.  No redness or swelling.  No fevers infections or shortness of breath.  No neuropathy.  She does have some fatigue.  She mentioned that she had normal menstrual period before the start of the chemotherapy but since the chemotherapy she has not had any menstrual bleeding.    ECOG 1    ROS:  A comprehensive ROS  was otherwise neg        Past Medical/Surgical History:  No past medical history on file.  No past surgical history on file.  Breast cancer  Asthma  Anxiety    Cancer History:   As above    Allergies:  Allergies as of 01/31/2024    (No Known Allergies)     Current Medications:  Current Outpatient Medications   Medication Sig Dispense Refill    citalopram (CELEXA) 40 MG tablet TAKE 1 TABLET BY MOUTH DAILY* 90 tablet 1    FLOVENT  MCG/ACT inhaler Inhale 1 puff into the lungs 2 times daily 12 g 11    rizatriptan (MAXALT) 10 MG tablet TAKE 1 TABLET AT ONSET OF HEADACHE. Can repeat dose 4 hours later if needed. Max 3/24 hours. 30 tablet 3    tamoxifen (NOLVADEX) 20 MG tablet TAKE 1 TABLET BY MOUTH DAILY*      VENTOLIN  (90 Base) MCG/ACT inhaler INHALE TWO PUFFS BY MOUTH EVERY 4 HOURS 18 g 4      Family History:  No family history on file.  Maternal grand mother with leukemia  Paternal uncle multiple myeloma  Paternal aunt with breast cancer  She has 4 children- youngest has asthma    Social History:  Social History     Socioeconomic History    Marital status:      Spouse name: Not on file    Number of children: Not on file    Years of education: Not on file    Highest education level: Not on file   Occupational History    Not on file   Tobacco Use    Smoking status: Every Day     Types: Cigarettes    Smokeless tobacco: Never   Vaping Use    Vaping Use: Never used   Substance and Sexual Activity    Alcohol use: Not on file    Drug use: Not on file    Sexual activity: Not on file   Other Topics Concern    Not on file   Social History Narrative    Not on file     Social Determinants of Health     Financial Resource Strain: Low Risk  (12/20/2023)    Financial Resource Strain     Within the past 12 months, have you or your family members you live with been unable to get utilities (heat, electricity) when it was really needed?: No   Food Insecurity: Low Risk  (12/20/2023)    Food Insecurity     Within the  "past 12 months, did you worry that your food would run out before you got money to buy more?: No     Within the past 12 months, did the food you bought just not last and you didn t have money to get more?: No   Transportation Needs: Low Risk  (12/20/2023)    Transportation Needs     Within the past 12 months, has lack of transportation kept you from medical appointments, getting your medicines, non-medical meetings or appointments, work, or from getting things that you need?: No   Physical Activity: Not on file   Stress: Not on file   Social Connections: Not on file   Interpersonal Safety: Low Risk  (12/20/2023)    Interpersonal Safety     Do you feel physically and emotionally safe where you currently live?: Yes     Within the past 12 months, have you been hit, slapped, kicked or otherwise physically hurt by someone?: No     Within the past 12 months, have you been humiliated or emotionally abused in other ways by your partner or ex-partner?: No   Housing Stability: Low Risk  (12/20/2023)    Housing Stability     Do you have housing? : Yes     Are you worried about losing your housing?: No     Smokes on average 1/4 ppd. Used to smoke a ppd x 23 years. No etoh use. Lives with . Moved from [place] to [place] because son lives here      Physical Exam:  /69 (BP Location: Left arm, Patient Position: Chair, Cuff Size: Adult Regular)   Pulse 66   Ht 1.6 m (5' 3\")   Wt 98.4 kg (217 lb)   SpO2 96%   BMI 38.44 kg/m      Wt Readings from Last 4 Encounters:   01/31/24 98.4 kg (217 lb)   12/20/23 98.4 kg (217 lb)     CONSTITUTIONAL: no acute distress  EYES: PERRLA, no palor or icterus.   ENT/MOUTH: no mouth lesions. Ears normal  CVS: s1s2 no m r g .   RESPIRATORY: clear to auscultation b/l  GI: soft non tender no hepatosplenomegaly  NEURO: AAOX3  Grossly non focal neuro exam  INTEGUMENT: no obvious rashes  LYMPHATIC: no palpable cervical, supraclavicular, axillary or inguinal LAD  MUSCULOSKELETAL: Unremarkable. No bony " tenderness.   EXTREMITIES: no edema  PSYCH: Mentation, mood and affect are normal. Decision making capacity is intact  Breast exam.  Done in presence of female chaperone.  On the right side surgical scars are well-healed.  No concerning palpable abnormality.  Left breast exam was unremarkable.    Laboratory/Imaging Studies      Reviewed    9/29/2023  CBC unremarkable  CMP unremarkable      1/10/2024.  Bilateral diagnostic mammogram and ultrasound of right breast showed unremarkable findings and no change from mammogram dated 7/17/2023.    ASSESSMENT/PLAN:      She is a s/p lumpectomy and sentinel lymph node biopsy in July 2021 for STAGE IA ( pT1c pN1, cM0 ) 18 mm, invasive ductal carcinoma of the right breast, grade 2, ER and UT positive.  HER2/alex FISH was negative.  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  Margins were negative.  1 out of 7 lymph node was positive.  Oncotype Dx score of 23 translating into 19% distant recurrence risk at 9 years of endocrine therapy.  8/26/2021 - 10/28/2021.  Adjuvant Cytoxan/Taxotere x 4 cycles  12/21/2021.  Completed adjuvant radiation    Started tamoxifen in March 2022.    Currently no evidence of recurrence and she is tolerating tamoxifen fairly well.    Plan to repeat bilateral mammogram in January 2025.    She will continue tamoxifen for now but since she has not had a menstrual period for about 2 years, I would recommend checking FSH LH and estradiol to see if she is in menopause.  If she is not menopausal then we will switch to Arimidex.  We discussed the rational schedule and potential side effects of it in detail.    We will also do DEXA scan if we switch her to Arimidex.  She should be on calcium and vitamin D.      Hot flashes.  She gets hot flashes with tamoxifen but these are not very bothersome to her at this time it is reasonable to observe.    She should annual GYN exam while taking tamoxifen.    Right leg pain.  I am not exactly sure about the cause  but we will check ultrasound of the right lower extremity to rule out DVT.  If it is unremarkable then she should follow with PCP because it is a possibility it could be coming from the lower back.    Discussion regarding exercise.  I recommend at least 150 minutes of aerobic weightbearing exercise weekly.    Smoking.  Congratulated her on her efforts on cutting down on smoking and encouraged her to keep working hard to completely stop smoking in the near future    We will determine the follow-up with me after reviewing labs as mentioned above.    All questions answered.  She is agreeable and comfortable with the plan.    China Frederick MD

## 2024-01-31 NOTE — NURSING NOTE
"Oncology Rooming Note    January 31, 2024 12:22 PM   Oxana Mathias is a 48 year old female who presents for:    Chief Complaint   Patient presents with    Oncology Clinic Visit     New Patient     Initial Vitals: /69 (BP Location: Left arm, Patient Position: Chair, Cuff Size: Adult Regular)   Pulse 66   Ht 1.6 m (5' 3\")   Wt 98.4 kg (217 lb)   SpO2 96%   BMI 38.44 kg/m   Estimated body mass index is 38.44 kg/m  as calculated from the following:    Height as of this encounter: 1.6 m (5' 3\").    Weight as of this encounter: 98.4 kg (217 lb). Body surface area is 2.09 meters squared.  No Pain (0) Comment: Data Unavailable   No LMP recorded. Patient is postmenopausal.  Allergies reviewed: Yes  Medications reviewed: Yes    Medications: Medication refills not needed today.  Pharmacy name entered into comScore: [id number] - [facility] [address] [place] [address]    Frailty Screening:   Is the patient here for a new oncology consult visit in cancer care? 1. Yes. Over the past month, have you experienced difficulty or required a caregiver to assist with:   1. Balance, walking or general mobility (including any falls)? NO  2. Completion of self-care tasks such as bathing, dressing, toileting, grooming/hygiene?  NO  3. Concentration or memory that affects your daily life?  YES       Clinical concerns: New Patient      Rhona Piper CMA              "

## 2024-02-01 ENCOUNTER — ANCILLARY PROCEDURE (OUTPATIENT)
Dept: ULTRASOUND IMAGING | Facility: CLINIC | Age: 49
End: 2024-02-01
Attending: INTERNAL MEDICINE
Payer: COMMERCIAL

## 2024-02-01 DIAGNOSIS — M79.661 PAIN OF RIGHT LOWER LEG: ICD-10-CM

## 2024-02-01 DIAGNOSIS — C50.911 MALIGNANT NEOPLASM OF RIGHT FEMALE BREAST, UNSPECIFIED ESTROGEN RECEPTOR STATUS, UNSPECIFIED SITE OF BREAST (H): Primary | ICD-10-CM

## 2024-02-01 DIAGNOSIS — C50.911 MALIGNANT NEOPLASM OF RIGHT FEMALE BREAST, UNSPECIFIED ESTROGEN RECEPTOR STATUS, UNSPECIFIED SITE OF BREAST (H): ICD-10-CM

## 2024-02-01 LAB
ESTRADIOL SERPL-MCNC: <5 PG/ML
FSH SERPL IRP2-ACNC: 19.1 MIU/ML
LH SERPL-ACNC: 13.8 MIU/ML

## 2024-02-01 PROCEDURE — 93971 EXTREMITY STUDY: CPT | Mod: RT | Performed by: RADIOLOGY

## 2024-02-01 RX ORDER — TAMOXIFEN CITRATE 20 MG/1
TABLET ORAL
Qty: 90 TABLET | Refills: 3 | Status: SHIPPED | OUTPATIENT
Start: 2024-02-01 | End: 2024-08-21

## 2024-02-05 ENCOUNTER — TELEPHONE (OUTPATIENT)
Dept: GASTROENTEROLOGY | Facility: CLINIC | Age: 49
End: 2024-02-05

## 2024-02-05 NOTE — TELEPHONE ENCOUNTER
Pre assessment completed for upcoming procedure.   (Please see previous telephone encounter notes for complete details)    Patient  returned call.       Procedure details:    Arrival time and facility location reviewed.    Pre op exam needed? N/A    Designated  policy reviewed. Instructed to have someone stay 6  hours post procedure.     COVID policy reviewed.      Medication review:    Medications reviewed. Please see supporting documentation below. Holding recommendations discussed (if applicable).   N/A      Prep for procedure:     Procedure prep instructions reviewed.        Any additional information needed:  N/A      Patient  verbalized understanding and had no questions or concerns at this time.      Janine Borrero RN  Endoscopy Procedure Pre Assessment RN  [phone] option 4

## 2024-02-05 NOTE — TELEPHONE ENCOUNTER
Pre visit planning completed.      Procedure details:    Patient scheduled for Colonoscopy  on 02.20.2024.     Arrival time: 1015. Procedure time 1100    Pre op exam needed? N/A    Facility location: [facility]; [address], [place] [address]    Sedation type: Conscious sedation     Indication for procedure: Screen for colon cancer       Chart review:     Electronic implanted devices? No    Recent diagnosis of diverticulitis within the last 6 weeks? No    Diabetic? No    Diabetic medication HOLDING recommendations: (if applicable)  Oral diabetic medications: N/A  Diabetic injectables: N/A  Insulin: N/A      Medication review:    Anticoagulants? No    NSAIDS? No NSAID medications per patient's medication list.  RN will verify with pre-assessment call.    Other medication HOLDING recommendations:  N/A      Prep for procedure:     Bowel prep recommendation: Standard Miralax   Due to:  standard bowel prep.    Prep instructions sent via [employer]       Janine Borrero RN  Endoscopy Procedure Pre Assessment RN  [phone] option 4   Female

## 2024-02-05 NOTE — TELEPHONE ENCOUNTER
Called the Pt to complete Pre-Assessment. First Attempt. No answer, Left message.     Mini Mcdonald RN   Endoscopy Procedure Pre Assessment RN

## 2024-02-20 ENCOUNTER — HOSPITAL ENCOUNTER (OUTPATIENT)
Facility: AMBULATORY SURGERY CENTER | Age: 49
Discharge: HOME | End: 2024-02-20
Attending: COLON & RECTAL SURGERY | Admitting: COLON & RECTAL SURGERY
Payer: COMMERCIAL

## 2024-02-20 VITALS
RESPIRATION RATE: 16 BRPM | HEART RATE: 68 BPM | DIASTOLIC BLOOD PRESSURE: 84 MMHG | TEMPERATURE: 97.2 F | OXYGEN SATURATION: 96 % | SYSTOLIC BLOOD PRESSURE: 125 MMHG

## 2024-02-20 LAB — COLONOSCOPY: NORMAL

## 2024-02-20 PROCEDURE — G8918 PT W/O PREOP ORDER IV AB PRO: HCPCS

## 2024-02-20 PROCEDURE — 45380 COLONOSCOPY AND BIOPSY: CPT

## 2024-02-20 PROCEDURE — G8907 PT DOC NO EVENTS ON DISCHARG: HCPCS

## 2024-02-20 PROCEDURE — 88305 TISSUE EXAM BY PATHOLOGIST: CPT | Performed by: PATHOLOGY

## 2024-02-20 RX ORDER — ONDANSETRON 2 MG/ML
4 INJECTION INTRAMUSCULAR; INTRAVENOUS EVERY 6 HOURS PRN
Status: CANCELLED | OUTPATIENT
Start: 2024-02-20

## 2024-02-20 RX ORDER — NALOXONE HYDROCHLORIDE 0.4 MG/ML
0.2 INJECTION, SOLUTION INTRAMUSCULAR; INTRAVENOUS; SUBCUTANEOUS
Status: CANCELLED | OUTPATIENT
Start: 2024-02-20

## 2024-02-20 RX ORDER — FLUMAZENIL 0.1 MG/ML
0.2 INJECTION, SOLUTION INTRAVENOUS
Status: CANCELLED | OUTPATIENT
Start: 2024-02-20 | End: 2024-02-20

## 2024-02-20 RX ORDER — LIDOCAINE 40 MG/G
CREAM TOPICAL
Status: DISCONTINUED | OUTPATIENT
Start: 2024-02-20 | End: 2024-02-21 | Stop reason: HOSPADM

## 2024-02-20 RX ORDER — ONDANSETRON 4 MG/1
4 TABLET, ORALLY DISINTEGRATING ORAL EVERY 6 HOURS PRN
Status: CANCELLED | OUTPATIENT
Start: 2024-02-20

## 2024-02-20 RX ORDER — NALOXONE HYDROCHLORIDE 0.4 MG/ML
0.4 INJECTION, SOLUTION INTRAMUSCULAR; INTRAVENOUS; SUBCUTANEOUS
Status: CANCELLED | OUTPATIENT
Start: 2024-02-20

## 2024-02-20 RX ORDER — PROCHLORPERAZINE MALEATE 10 MG
10 TABLET ORAL EVERY 6 HOURS PRN
Status: CANCELLED | OUTPATIENT
Start: 2024-02-20

## 2024-02-20 RX ORDER — SODIUM CHLORIDE, SODIUM LACTATE, POTASSIUM CHLORIDE, CALCIUM CHLORIDE 600; 310; 30; 20 MG/100ML; MG/100ML; MG/100ML; MG/100ML
INJECTION, SOLUTION INTRAVENOUS CONTINUOUS
Status: DISCONTINUED | OUTPATIENT
Start: 2024-02-20 | End: 2024-02-21 | Stop reason: HOSPADM

## 2024-02-20 RX ORDER — ONDANSETRON 2 MG/ML
4 INJECTION INTRAMUSCULAR; INTRAVENOUS
Status: DISCONTINUED | OUTPATIENT
Start: 2024-02-20 | End: 2024-02-21 | Stop reason: HOSPADM

## 2024-02-20 RX ORDER — FENTANYL CITRATE 50 UG/ML
INJECTION, SOLUTION INTRAMUSCULAR; INTRAVENOUS PRN
Status: DISCONTINUED | OUTPATIENT
Start: 2024-02-20 | End: 2024-02-20 | Stop reason: HOSPADM

## 2024-02-21 ENCOUNTER — TELEPHONE (OUTPATIENT)
Dept: FAMILY MEDICINE | Facility: CLINIC | Age: 49
End: 2024-02-21
Payer: COMMERCIAL

## 2024-02-21 NOTE — TELEPHONE ENCOUNTER
Called pharmacy and left detailed voicemail with providers message.   Advised to call clinic back if any questions.     Coty PAYNEN, RN

## 2024-02-21 NOTE — TELEPHONE ENCOUNTER
Fax message: Brand name   FLOVENT  MCG/ACT inhaler    Has been discontinued. Is it okay to use generic? This has a CRISTOPHER for Brand.

## 2024-02-22 LAB
PATH REPORT.COMMENTS IMP SPEC: NORMAL
PATH REPORT.COMMENTS IMP SPEC: NORMAL
PATH REPORT.FINAL DX SPEC: NORMAL
PATH REPORT.GROSS SPEC: NORMAL
PATH REPORT.MICROSCOPIC SPEC OTHER STN: NORMAL
PATH REPORT.RELEVANT HX SPEC: NORMAL
PHOTO IMAGE: NORMAL

## 2024-03-05 ENCOUNTER — PATIENT OUTREACH (OUTPATIENT)
Dept: GASTROENTEROLOGY | Facility: CLINIC | Age: 49
End: 2024-03-05
Payer: COMMERCIAL

## 2024-07-30 DIAGNOSIS — J45.20 MILD INTERMITTENT ASTHMA, UNSPECIFIED WHETHER COMPLICATED: ICD-10-CM

## 2024-07-30 DIAGNOSIS — F33.1 MODERATE EPISODE OF RECURRENT MAJOR DEPRESSIVE DISORDER (H): ICD-10-CM

## 2024-07-31 RX ORDER — ALBUTEROL SULFATE 90 UG/1
AEROSOL, METERED RESPIRATORY (INHALATION)
Qty: 18 G | Refills: 0 | Status: SHIPPED | OUTPATIENT
Start: 2024-07-31

## 2024-07-31 RX ORDER — CITALOPRAM HYDROBROMIDE 40 MG/1
TABLET ORAL
Qty: 30 TABLET | Refills: 0 | Status: SHIPPED | OUTPATIENT
Start: 2024-07-31 | End: 2024-07-31

## 2024-07-31 RX ORDER — CITALOPRAM HYDROBROMIDE 40 MG/1
TABLET ORAL
Qty: 90 TABLET | Refills: 1 | Status: SHIPPED | OUTPATIENT
Start: 2024-07-31

## 2024-08-21 ENCOUNTER — ONCOLOGY VISIT (OUTPATIENT)
Dept: ONCOLOGY | Facility: CLINIC | Age: 49
End: 2024-08-21
Attending: INTERNAL MEDICINE
Payer: COMMERCIAL

## 2024-08-21 VITALS
OXYGEN SATURATION: 96 % | DIASTOLIC BLOOD PRESSURE: 74 MMHG | BODY MASS INDEX: 40.22 KG/M2 | WEIGHT: 227 LBS | HEART RATE: 66 BPM | SYSTOLIC BLOOD PRESSURE: 110 MMHG | HEIGHT: 63 IN

## 2024-08-21 DIAGNOSIS — Z71.6 ENCOUNTER FOR TOBACCO USE CESSATION COUNSELING: ICD-10-CM

## 2024-08-21 DIAGNOSIS — Z12.31 ENCOUNTER FOR SCREENING MAMMOGRAM FOR BREAST CANCER: Primary | ICD-10-CM

## 2024-08-21 DIAGNOSIS — N95.1 SYMPTOMATIC MENOPAUSAL OR FEMALE CLIMACTERIC STATES: ICD-10-CM

## 2024-08-21 DIAGNOSIS — C50.911 MALIGNANT NEOPLASM OF RIGHT FEMALE BREAST, UNSPECIFIED ESTROGEN RECEPTOR STATUS, UNSPECIFIED SITE OF BREAST (H): ICD-10-CM

## 2024-08-21 PROCEDURE — G0463 HOSPITAL OUTPT CLINIC VISIT: HCPCS | Performed by: INTERNAL MEDICINE

## 2024-08-21 PROCEDURE — 99214 OFFICE O/P EST MOD 30 MIN: CPT | Performed by: INTERNAL MEDICINE

## 2024-08-21 PROCEDURE — G2211 COMPLEX E/M VISIT ADD ON: HCPCS | Performed by: INTERNAL MEDICINE

## 2024-08-21 RX ORDER — TAMOXIFEN CITRATE 20 MG/1
TABLET ORAL
Qty: 90 TABLET | Refills: 3 | Status: SHIPPED | OUTPATIENT
Start: 2024-08-21

## 2024-08-21 ASSESSMENT — PAIN SCALES - GENERAL: PAINLEVEL: MODERATE PAIN (5)

## 2024-08-21 NOTE — PATIENT INSTRUCTIONS
Continue tamoxifen.    Repeat labs and mammogram in January 2025 and see me a few days after that    Refer to smoking cessation program

## 2024-08-21 NOTE — NURSING NOTE
"Oncology Rooming Note    August 21, 2024 10:38 AM   Oxana Mathias is a 49 year old female who presents for:    Chief Complaint   Patient presents with    Oncology Clinic Visit     Initial Vitals: /74 (BP Location: Left arm)   Pulse 66   Ht 1.6 m (5' 3\")   Wt 103 kg (227 lb)   SpO2 96%   BMI 40.21 kg/m   Estimated body mass index is 40.21 kg/m  as calculated from the following:    Height as of this encounter: 1.6 m (5' 3\").    Weight as of this encounter: 103 kg (227 lb). Body surface area is 2.14 meters squared.  Moderate Pain (5) Comment: Data Unavailable   No LMP recorded. Patient is postmenopausal.  Allergies reviewed: Yes  Medications reviewed: Yes    Medications: Medication refills not needed today.  Pharmacy name entered into [employer]: [facility] #[id number] - [facility] [address] [facility] [place]     Frailty Screening:   Is the patient here for a new oncology consult visit in cancer care? 2. No      Clinical concerns: No Concerns        Falguni Garrison MA            " oral

## 2024-08-21 NOTE — LETTER
8/21/2024      Oxana Mathias  [address] [facility] [address]      Dear Colleague,    Thank you for referring your patient, Oxana Mathias, to the [facility]. Please see a copy of my visit note below.    Oncology follow-up visit:  Date on this visit: 8/21/24     Diagnosis.  Right-sided breast cancer.      Primary Physician: Marquez Aguayo     History of present illness:      She is 49 year old premenopausal who presents for right-sided breast cancer.  I initially saw her on 1/31/2024.  Please see my previous note for details.  I have copied and updated from prior notes.    I have reviewed notes from outside oncologist.  She was found to have a right-sided hormone receptor positive invasive ductal carcinoma of the breast for which she underwent lumpectomy with sentinel lymph node biopsy on 7/6/2021.    It was stage IA ( pT1c pN1, cM0 ) 18 mm, invasive ductal carcinoma of the right breast, grade 2, ER and LA positive.  HER2/alex FISH was negative.  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  Margins were negative.  1 out of 7 lymph node was positive.  Oncotype Dx score of 23 translating into 19% distant recurrence risk at 9 years of endocrine therapy.  8/26/2021 - 10/28/2021.  Adjuvant Cytoxan/Taxotere x 4 cycles  12/21/2021.  Completed adjuvant radiation    3/29/2022.  Started tamoxifen 20 mg daily.    Interval history.  She has been tolerating tamoxifen well.  She has mild hot flashes.  Denies any new pain or new swellings.  She still feels sometimes right leg pain and sometimes it radiates from the back.  Previously we did ultrasound of the right leg which did not show any DVT.  She can walk normally.  Denies any abnormal vaginal bleeding or vaginal dryness.  No infections or shortness of breath.  She has cut down on smoking and smoking 3 cigarettes a day.  She has quit for 2 to 3 weeks but then restarts that.     ECOG 1    ROS:  A comprehensive ROS was  otherwise neg    I reviewed the history in epic as below.    She mentioned that she had normal menstrual period before the start of the chemotherapy but since the chemotherapy she has not had any menstrual bleeding.    Past Medical/Surgical History:  No past medical history on file.  Past Surgical History:   Procedure Laterality Date     COLONOSCOPY N/A 2/20/2024    Procedure: COLONOSCOPY, WITH POLYPECTOMY AND BIOPSY;  Surgeon: Vita Victoria MD;  Location: [facility] OR     COLONOSCOPY WITH CO2 INSUFFLATION N/A 2/20/2024    Procedure: Colonoscopy with CO2 insufflation;  Surgeon: Vita Victoria MD;  Location: [facility] OR     Breast cancer  Asthma  Anxiety    Cancer History:   As above    Allergies:  Allergies as of 08/21/2024     (No Known Allergies)     Current Medications:  Current Outpatient Medications   Medication Sig Dispense Refill     citalopram (CELEXA) 40 MG tablet TAKE 1 TABLET BY MOUTH DAILY 90 tablet 1     FLOVENT  MCG/ACT inhaler Inhale 1 puff into the lungs 2 times daily 12 g 11     rizatriptan (MAXALT) 10 MG tablet TAKE 1 TABLET AT ONSET OF HEADACHE. Can repeat dose 4 hours later if needed. Max 3/24 hours. 30 tablet 3     tamoxifen (NOLVADEX) 20 MG tablet TAKE 1 TABLET BY MOUTH DAILY* 90 tablet 3     VENTOLIN  (90 Base) MCG/ACT inhaler INHALE TWO PUFFS BY MOUTH EVERY 4 HOURS 18 g 0      Family History:  No family history on file.  Maternal grand mother with leukemia  Paternal uncle multiple myeloma  Paternal aunt with breast cancer  She has 4 children- youngest has asthma    Social History:  Social History     Socioeconomic History     Marital status:      Spouse name: Not on file     Number of children: Not on file     Years of education: Not on file     Highest education level: Not on file   Occupational History     Not on file   Tobacco Use     Smoking status: Every Day     Types: Cigarettes     Smokeless tobacco: Never   Vaping Use     Vaping status: Never Used   Substance and  "Sexual Activity     Alcohol use: Not on file     Drug use: Not on file     Sexual activity: Not on file   Other Topics Concern     Not on file   Social History Narrative     Not on file     Social Determinants of Health     Financial Resource Strain: Low Risk  (12/20/2023)    Financial Resource Strain      Within the past 12 months, have you or your family members you live with been unable to get utilities (heat, electricity) when it was really needed?: No   Food Insecurity: Low Risk  (12/20/2023)    Food Insecurity      Within the past 12 months, did you worry that your food would run out before you got money to buy more?: No      Within the past 12 months, did the food you bought just not last and you didn t have money to get more?: No   Transportation Needs: Low Risk  (12/20/2023)    Transportation Needs      Within the past 12 months, has lack of transportation kept you from medical appointments, getting your medicines, non-medical meetings or appointments, work, or from getting things that you need?: No   Physical Activity: Not on file   Stress: Not on file   Social Connections: Not on file   Interpersonal Safety: Low Risk  (12/20/2023)    Interpersonal Safety      Do you feel physically and emotionally safe where you currently live?: Yes      Within the past 12 months, have you been hit, slapped, kicked or otherwise physically hurt by someone?: No      Within the past 12 months, have you been humiliated or emotionally abused in other ways by your partner or ex-partner?: No   Housing Stability: Low Risk  (12/20/2023)    Housing Stability      Do you have housing? : Yes      Are you worried about losing your housing?: No     Smokes on average 1/4 ppd. Used to smoke a ppd x 23 years. No etoh use. Lives with . Moved from [place] to [place] because son lives here      Physical Exam:  /74 (BP Location: Left arm)   Pulse 66   Ht 1.6 m (5' 3\")   Wt 103 kg (227 lb)   SpO2 96%   BMI 40.21 kg/m      Wt Readings " from Last 4 Encounters:   08/21/24 103 kg (227 lb)   01/31/24 98.4 kg (217 lb)   12/20/23 98.4 kg (217 lb)     CONSTITUTIONAL: No apparent distress  EYES: PERRLA, without pallor or jaundice  ENT/MOUTH: Ears unremarkable. No oral lesions  CVS: s1s2 normal  RESPIRATORY: Chest is clear  GI: Abdomen is benign  NEURO: Alert and oriented ×3  INTEGUMENT: no concerning skin rashes   LYMPHATIC: no palpable lymphadenopathy  MUSCULOSKELETAL: Unremarkable. No bony tenderness.   EXTREMITIES: no pedal edema  PSYCH: Mentation, mood and affect are appropriate      Laboratory/Imaging Studies      Reviewed     1/31/2024  Estradiol <5  FSH 19.1    LH 13.8      9/29/2023  CBC unremarkable  CMP unremarkable      1/10/2024.  Bilateral diagnostic mammogram and ultrasound of right breast showed unremarkable findings and no change from mammogram dated 7/17/2023.    Right lower extremity Doppler ultrasound 2/1/2024    Impression:     Right leg: No deep venous thrombosis.       ASSESSMENT/PLAN:    Right-sided breast cancer.  Hormone receptor positive.  HER2/alex negative.  She is s/p lumpectomy and sentinel lymph node biopsy in July 2021 for STAGE IA ( pT1c pN1, cM0 ) 18 mm, invasive ductal carcinoma of the right breast, grade 2, ER and LA positive.  HER2/alex FISH was negative.  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  Margins were negative.  1 out of 7 lymph node was positive.  Oncotype Dx score of 23 translating into 19% distant recurrence risk at 9 years of endocrine therapy.  8/26/2021 - 10/28/2021.  Adjuvant Cytoxan/Taxotere x 4 cycles  12/21/2021.  Completed adjuvant radiation    Started tamoxifen in March 2022.    FSH in January 2024 was not in the menopausal range.    For now we will continue tamoxifen.    We will repeat bilateral mammogram in January 2025.  I will also repeat hormone levels at that time.    We will also do DEXA scan if we switch her to Arimidex.  She should be on calcium and vitamin D.      Hot flashes.   She gets hot flashes with tamoxifen but these are not very bothersome to her at this time it is reasonable to observe.    She should annual GYN exam while taking tamoxifen.    Right leg pain.  Right lower extremity ultrasound was negative for DVT.  Sometimes she feels pain coming from the back going to the leg.  Advised her to follow-up with PCP.  She may need more testing related to spine.      Smoking.  She has cut down on smoking but still smoking 3 cigarettes a day.  At times she is good for 2 to 3-week but then restarts that.  I counseled her regarding smoking cessation.  I also recommend that she follow-up with the smoking cessation program and she is interested in that so I gave her the referral.        See me back in 6 months      All questions answered.  She is agreeable and comfortable with the plan.    China Frederick MD     The longitudinal plan of care for the Breast cancer as documented were addressed during this visit. Due to the added complexity in care, I will continue to support Oxana in the subsequent management and with ongoing continuity of care.      Again, thank you for allowing me to participate in the care of your patient.        Sincerely,        China Frederick MD

## 2024-08-21 NOTE — PROGRESS NOTES
Oncology follow-up visit:  Date on this visit: 8/21/24     Diagnosis.  Right-sided breast cancer.      Primary Physician: Marquez Aguayo     History of present illness:      She is 49 year old premenopausal who presents for right-sided breast cancer.  I initially saw her on 1/31/2024.  Please see my previous note for details.  I have copied and updated from prior notes.    I have reviewed notes from outside oncologist.  She was found to have a right-sided hormone receptor positive invasive ductal carcinoma of the breast for which she underwent lumpectomy with sentinel lymph node biopsy on 7/6/2021.    It was stage IA ( pT1c pN1, cM0 ) 18 mm, invasive ductal carcinoma of the right breast, grade 2, ER and VT positive.  HER2/alex FISH was negative.  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  Margins were negative.  1 out of 7 lymph node was positive.  Oncotype Dx score of 23 translating into 19% distant recurrence risk at 9 years of endocrine therapy.  8/26/2021 - 10/28/2021.  Adjuvant Cytoxan/Taxotere x 4 cycles  12/21/2021.  Completed adjuvant radiation    3/29/2022.  Started tamoxifen 20 mg daily.    Interval history.  She has been tolerating tamoxifen well.  She has mild hot flashes.  Denies any new pain or new swellings.  She still feels sometimes right leg pain and sometimes it radiates from the back.  Previously we did ultrasound of the right leg which did not show any DVT.  She can walk normally.  Denies any abnormal vaginal bleeding or vaginal dryness.  No infections or shortness of breath.  She has cut down on smoking and smoking 3 cigarettes a day.  She has quit for 2 to 3 weeks but then restarts that.     ECOG 1    ROS:  A comprehensive ROS was otherwise neg    I reviewed the history in epic as below.    She mentioned that she had normal menstrual period before the start of the chemotherapy but since the chemotherapy she has not had any menstrual bleeding.    Past Medical/Surgical History:  No  past medical history on file.  Past Surgical History:   Procedure Laterality Date    COLONOSCOPY N/A 2/20/2024    Procedure: COLONOSCOPY, WITH POLYPECTOMY AND BIOPSY;  Surgeon: Vita Victoria MD;  Location: [facility] OR    COLONOSCOPY WITH CO2 INSUFFLATION N/A 2/20/2024    Procedure: Colonoscopy with CO2 insufflation;  Surgeon: Vita Victoria MD;  Location: [facility] OR     Breast cancer  Asthma  Anxiety    Cancer History:   As above    Allergies:  Allergies as of 08/21/2024    (No Known Allergies)     Current Medications:  Current Outpatient Medications   Medication Sig Dispense Refill    citalopram (CELEXA) 40 MG tablet TAKE 1 TABLET BY MOUTH DAILY 90 tablet 1    FLOVENT  MCG/ACT inhaler Inhale 1 puff into the lungs 2 times daily 12 g 11    rizatriptan (MAXALT) 10 MG tablet TAKE 1 TABLET AT ONSET OF HEADACHE. Can repeat dose 4 hours later if needed. Max 3/24 hours. 30 tablet 3    tamoxifen (NOLVADEX) 20 MG tablet TAKE 1 TABLET BY MOUTH DAILY* 90 tablet 3    VENTOLIN  (90 Base) MCG/ACT inhaler INHALE TWO PUFFS BY MOUTH EVERY 4 HOURS 18 g 0      Family History:  No family history on file.  Maternal grand mother with leukemia  Paternal uncle multiple myeloma  Paternal aunt with breast cancer  She has 4 children- youngest has asthma    Social History:  Social History     Socioeconomic History    Marital status:      Spouse name: Not on file    Number of children: Not on file    Years of education: Not on file    Highest education level: Not on file   Occupational History    Not on file   Tobacco Use    Smoking status: Every Day     Types: Cigarettes    Smokeless tobacco: Never   Vaping Use    Vaping status: Never Used   Substance and Sexual Activity    Alcohol use: Not on file    Drug use: Not on file    Sexual activity: Not on file   Other Topics Concern    Not on file   Social History Narrative    Not on file     Social Determinants of Health     Financial Resource Strain: Low Risk  (12/20/2023)  "   Financial Resource Strain     Within the past 12 months, have you or your family members you live with been unable to get utilities (heat, electricity) when it was really needed?: No   Food Insecurity: Low Risk  (12/20/2023)    Food Insecurity     Within the past 12 months, did you worry that your food would run out before you got money to buy more?: No     Within the past 12 months, did the food you bought just not last and you didn t have money to get more?: No   Transportation Needs: Low Risk  (12/20/2023)    Transportation Needs     Within the past 12 months, has lack of transportation kept you from medical appointments, getting your medicines, non-medical meetings or appointments, work, or from getting things that you need?: No   Physical Activity: Not on file   Stress: Not on file   Social Connections: Not on file   Interpersonal Safety: Low Risk  (12/20/2023)    Interpersonal Safety     Do you feel physically and emotionally safe where you currently live?: Yes     Within the past 12 months, have you been hit, slapped, kicked or otherwise physically hurt by someone?: No     Within the past 12 months, have you been humiliated or emotionally abused in other ways by your partner or ex-partner?: No   Housing Stability: Low Risk  (12/20/2023)    Housing Stability     Do you have housing? : Yes     Are you worried about losing your housing?: No     Smokes on average 1/4 ppd. Used to smoke a ppd x 23 years. No etoh use. Lives with . Moved from [place] to [place] because son lives here      Physical Exam:  /74 (BP Location: Left arm)   Pulse 66   Ht 1.6 m (5' 3\")   Wt 103 kg (227 lb)   SpO2 96%   BMI 40.21 kg/m      Wt Readings from Last 4 Encounters:   08/21/24 103 kg (227 lb)   01/31/24 98.4 kg (217 lb)   12/20/23 98.4 kg (217 lb)     CONSTITUTIONAL: No apparent distress  EYES: PERRLA, without pallor or jaundice  ENT/MOUTH: Ears unremarkable. No oral lesions  CVS: s1s2 normal  RESPIRATORY: Chest is " clear  GI: Abdomen is benign  NEURO: Alert and oriented ×3  INTEGUMENT: no concerning skin rashes   LYMPHATIC: no palpable lymphadenopathy  MUSCULOSKELETAL: Unremarkable. No bony tenderness.   EXTREMITIES: no pedal edema  PSYCH: Mentation, mood and affect are appropriate      Laboratory/Imaging Studies      Reviewed     1/31/2024  Estradiol <5  FSH 19.1    LH 13.8      9/29/2023  CBC unremarkable  CMP unremarkable      1/10/2024.  Bilateral diagnostic mammogram and ultrasound of right breast showed unremarkable findings and no change from mammogram dated 7/17/2023.    Right lower extremity Doppler ultrasound 2/1/2024    Impression:     Right leg: No deep venous thrombosis.       ASSESSMENT/PLAN:    Right-sided breast cancer.  Hormone receptor positive.  HER2/alex negative.  She is s/p lumpectomy and sentinel lymph node biopsy in July 2021 for STAGE IA ( pT1c pN1, cM0 ) 18 mm, invasive ductal carcinoma of the right breast, grade 2, ER and ME positive.  HER2/alex FISH was negative.  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  Margins were negative.  1 out of 7 lymph node was positive.  Oncotype Dx score of 23 translating into 19% distant recurrence risk at 9 years of endocrine therapy.  8/26/2021 - 10/28/2021.  Adjuvant Cytoxan/Taxotere x 4 cycles  12/21/2021.  Completed adjuvant radiation    Started tamoxifen in March 2022.    FSH in January 2024 was not in the menopausal range.    For now we will continue tamoxifen.    We will repeat bilateral mammogram in January 2025.  I will also repeat hormone levels at that time.    We will also do DEXA scan if we switch her to Arimidex.  She should be on calcium and vitamin D.      Hot flashes.  She gets hot flashes with tamoxifen but these are not very bothersome to her at this time it is reasonable to observe.    She should annual GYN exam while taking tamoxifen.    Right leg pain.  Right lower extremity ultrasound was negative for DVT.  Sometimes she feels pain  coming from the back going to the leg.  Advised her to follow-up with PCP.  She may need more testing related to spine.      Smoking.  She has cut down on smoking but still smoking 3 cigarettes a day.  At times she is good for 2 to 3-week but then restarts that.  I counseled her regarding smoking cessation.  I also recommend that she follow-up with the smoking cessation program and she is interested in that so I gave her the referral.        See me back in 6 months      All questions answered.  She is agreeable and comfortable with the plan.    China Frederick MD     The longitudinal plan of care for the Breast cancer as documented were addressed during this visit. Due to the added complexity in care, I will continue to support Oxana in the subsequent management and with ongoing continuity of care.

## 2024-10-09 NOTE — PROGRESS NOTES
"  {PROVIDER CHARTING PREFERENCE:704603}    Florentin Daigle is a 49 year old, presenting for the following health issues:  Anxiety and Depression    Quit smoking with lozenges.        10/16/2024    11:04 AM   Additional Questions   Roomed by James Ramirez Ma   Accompanied by Self     History of Present Illness       Mental Health Follow-up:  Patient presents to follow-up on Depression & Anxiety.Patient's depression since last visit has been:  Better  The patient is not having other symptoms associated with depression.  Patient's anxiety since last visit has been:  No change  The patient is not having other symptoms associated with anxiety.  Any significant life events: grief or loss and other  Patient is not feeling anxious or having panic attacks.  Patient has no concerns about alcohol or drug use.    Reason for visit:  Depression, and a Physical    She eats 0-1 servings of fruits and vegetables daily.She consumes 1 sweetened beverage(s) daily.She exercises with enough effort to increase her heart rate 10 to 19 minutes per day.  She exercises with enough effort to increase her heart rate 3 or less days per week.   She is taking medications regularly.       Review of Systems  Constitutional, HEENT, cardiovascular, pulmonary, gi and gu systems are negative, except as otherwise noted.        Objective    /65   Pulse 71   Temp 98.3  F (36.8  C) (Tympanic)   Resp 16   Ht 1.6 m (5' 3\")   Wt 102.5 kg (226 lb)   SpO2 97%   Breastfeeding No   BMI 40.03 kg/m    Body mass index is 40.03 kg/m .      Physical Exam   {Exam List (Optional):356487}        Signed Electronically by: Marquez Aguayo PA-C  {Email feedback regarding this note to [email or web address]   :[id number]}    "

## 2024-10-15 ASSESSMENT — PATIENT HEALTH QUESTIONNAIRE - PHQ9
SUM OF ALL RESPONSES TO PHQ QUESTIONS 1-9: 5
SUM OF ALL RESPONSES TO PHQ QUESTIONS 1-9: 5
10. IF YOU CHECKED OFF ANY PROBLEMS, HOW DIFFICULT HAVE THESE PROBLEMS MADE IT FOR YOU TO DO YOUR WORK, TAKE CARE OF THINGS AT HOME, OR GET ALONG WITH OTHER PEOPLE: SOMEWHAT DIFFICULT

## 2024-10-15 ASSESSMENT — ASTHMA QUESTIONNAIRES
QUESTION_3 LAST FOUR WEEKS HOW OFTEN DID YOUR ASTHMA SYMPTOMS (WHEEZING, COUGHING, SHORTNESS OF BREATH, CHEST TIGHTNESS OR PAIN) WAKE YOU UP AT NIGHT OR EARLIER THAN USUAL IN THE MORNING: NOT AT ALL
ACT_TOTALSCORE: 25
QUESTION_5 LAST FOUR WEEKS HOW WOULD YOU RATE YOUR ASTHMA CONTROL: COMPLETELY CONTROLLED
QUESTION_2 LAST FOUR WEEKS HOW OFTEN HAVE YOU HAD SHORTNESS OF BREATH: NOT AT ALL
ACT_TOTALSCORE: 25
QUESTION_1 LAST FOUR WEEKS HOW MUCH OF THE TIME DID YOUR ASTHMA KEEP YOU FROM GETTING AS MUCH DONE AT WORK, SCHOOL OR AT HOME: NONE OF THE TIME
QUESTION_4 LAST FOUR WEEKS HOW OFTEN HAVE YOU USED YOUR RESCUE INHALER OR NEBULIZER MEDICATION (SUCH AS ALBUTEROL): NOT AT ALL

## 2024-10-15 ASSESSMENT — ANXIETY QUESTIONNAIRES
GAD7 TOTAL SCORE: 10
IF YOU CHECKED OFF ANY PROBLEMS ON THIS QUESTIONNAIRE, HOW DIFFICULT HAVE THESE PROBLEMS MADE IT FOR YOU TO DO YOUR WORK, TAKE CARE OF THINGS AT HOME, OR GET ALONG WITH OTHER PEOPLE: EXTREMELY DIFFICULT
3. WORRYING TOO MUCH ABOUT DIFFERENT THINGS: NOT AT ALL
2. NOT BEING ABLE TO STOP OR CONTROL WORRYING: NOT AT ALL
5. BEING SO RESTLESS THAT IT IS HARD TO SIT STILL: NEARLY EVERY DAY
7. FEELING AFRAID AS IF SOMETHING AWFUL MIGHT HAPPEN: SEVERAL DAYS
6. BECOMING EASILY ANNOYED OR IRRITABLE: NEARLY EVERY DAY
1. FEELING NERVOUS, ANXIOUS, OR ON EDGE: NOT AT ALL
8. IF YOU CHECKED OFF ANY PROBLEMS, HOW DIFFICULT HAVE THESE MADE IT FOR YOU TO DO YOUR WORK, TAKE CARE OF THINGS AT HOME, OR GET ALONG WITH OTHER PEOPLE?: EXTREMELY DIFFICULT
GAD7 TOTAL SCORE: 10
4. TROUBLE RELAXING: NEARLY EVERY DAY
7. FEELING AFRAID AS IF SOMETHING AWFUL MIGHT HAPPEN: SEVERAL DAYS
GAD7 TOTAL SCORE: 10

## 2024-10-16 ENCOUNTER — OFFICE VISIT (OUTPATIENT)
Dept: FAMILY MEDICINE | Facility: CLINIC | Age: 49
End: 2024-10-16
Payer: COMMERCIAL

## 2024-10-16 ENCOUNTER — ANCILLARY PROCEDURE (OUTPATIENT)
Dept: GENERAL RADIOLOGY | Facility: CLINIC | Age: 49
End: 2024-10-16
Attending: PHYSICIAN ASSISTANT
Payer: COMMERCIAL

## 2024-10-16 VITALS
HEART RATE: 71 BPM | HEIGHT: 63 IN | TEMPERATURE: 98.3 F | BODY MASS INDEX: 40.04 KG/M2 | WEIGHT: 226 LBS | SYSTOLIC BLOOD PRESSURE: 124 MMHG | RESPIRATION RATE: 16 BRPM | DIASTOLIC BLOOD PRESSURE: 65 MMHG | OXYGEN SATURATION: 97 %

## 2024-10-16 DIAGNOSIS — G43.009 MIGRAINE WITHOUT AURA AND WITHOUT STATUS MIGRAINOSUS, NOT INTRACTABLE: ICD-10-CM

## 2024-10-16 DIAGNOSIS — J45.20 MILD INTERMITTENT ASTHMA, UNSPECIFIED WHETHER COMPLICATED: ICD-10-CM

## 2024-10-16 DIAGNOSIS — F17.210 CIGARETTE NICOTINE DEPENDENCE WITHOUT COMPLICATION: ICD-10-CM

## 2024-10-16 DIAGNOSIS — Z13.6 CARDIOVASCULAR SCREENING; LDL GOAL LESS THAN 130: ICD-10-CM

## 2024-10-16 DIAGNOSIS — Z12.4 CERVICAL CANCER SCREENING: ICD-10-CM

## 2024-10-16 DIAGNOSIS — M54.50 LUMBAR PAIN: ICD-10-CM

## 2024-10-16 DIAGNOSIS — Z00.00 ROUTINE HISTORY AND PHYSICAL EXAMINATION OF ADULT: Primary | ICD-10-CM

## 2024-10-16 DIAGNOSIS — C50.911 MALIGNANT NEOPLASM OF RIGHT FEMALE BREAST, UNSPECIFIED ESTROGEN RECEPTOR STATUS, UNSPECIFIED SITE OF BREAST (H): ICD-10-CM

## 2024-10-16 DIAGNOSIS — F33.1 MODERATE EPISODE OF RECURRENT MAJOR DEPRESSIVE DISORDER (H): ICD-10-CM

## 2024-10-16 PROCEDURE — 72100 X-RAY EXAM L-S SPINE 2/3 VWS: CPT | Mod: TC | Performed by: PREVENTIVE MEDICINE

## 2024-10-16 PROCEDURE — G0145 SCR C/V CYTO,THINLAYER,RESCR: HCPCS | Performed by: PHYSICIAN ASSISTANT

## 2024-10-16 PROCEDURE — 99214 OFFICE O/P EST MOD 30 MIN: CPT | Mod: 25 | Performed by: PHYSICIAN ASSISTANT

## 2024-10-16 PROCEDURE — 99396 PREV VISIT EST AGE 40-64: CPT | Performed by: PHYSICIAN ASSISTANT

## 2024-10-16 PROCEDURE — 87624 HPV HI-RISK TYP POOLED RSLT: CPT | Performed by: PHYSICIAN ASSISTANT

## 2024-10-16 PROCEDURE — 36415 COLL VENOUS BLD VENIPUNCTURE: CPT | Performed by: PHYSICIAN ASSISTANT

## 2024-10-16 PROCEDURE — 80048 BASIC METABOLIC PNL TOTAL CA: CPT | Performed by: PHYSICIAN ASSISTANT

## 2024-10-16 PROCEDURE — 80061 LIPID PANEL: CPT | Performed by: PHYSICIAN ASSISTANT

## 2024-10-16 RX ORDER — DEXAMETHASONE 4 MG/1
1 TABLET ORAL 2 TIMES DAILY
Qty: 12 G | Refills: 11 | Status: SHIPPED | OUTPATIENT
Start: 2024-10-16

## 2024-10-16 RX ORDER — ALBUTEROL SULFATE 90 UG/1
INHALANT RESPIRATORY (INHALATION)
Qty: 18 G | Refills: 0 | Status: SHIPPED | OUTPATIENT
Start: 2024-10-16

## 2024-10-16 RX ORDER — RIZATRIPTAN BENZOATE 10 MG/1
TABLET ORAL
Qty: 30 TABLET | Refills: 3 | Status: SHIPPED | OUTPATIENT
Start: 2024-10-16

## 2024-10-16 RX ORDER — POLYETHYLENE GLYCOL 3350 17 G
2 POWDER IN PACKET (EA) ORAL
Qty: 24 LOZENGE | Refills: 3 | Status: SHIPPED | OUTPATIENT
Start: 2024-10-16

## 2024-10-16 RX ORDER — CITALOPRAM HYDROBROMIDE 40 MG/1
TABLET ORAL
Qty: 90 TABLET | Refills: 1 | Status: SHIPPED | OUTPATIENT
Start: 2024-10-16

## 2024-10-16 ASSESSMENT — PAIN SCALES - GENERAL: PAINLEVEL: NO PAIN (0)

## 2024-10-16 NOTE — LETTER
My Asthma Action Plan    Name: Oxana Mathias   YOB: 1975  Date: 10/16/2024   My doctor: Marquez Aguayo PA-C   My clinic: [facility]        My Rescue Medicine:   Albuterol inhaler (Proair/Ventolin/Proventil HFA)  2-4 puffs EVERY 4 HOURS as needed. Use a spacer if recommended by your provider.   My Asthma Severity:   Intermittent / Exercise Induced  Know your asthma triggers:              GREEN ZONE   Good Control  I feel good  No cough or wheeze  Can work, sleep and play without asthma symptoms       Take your asthma control medicine every day.     If exercise triggers your asthma, take your rescue medication  15 minutes before exercise or sports, and  During exercise if you have asthma symptoms  Spacer to use with inhaler: If you have a spacer, make sure to use it with your inhaler             YELLOW ZONE Getting Worse  I have ANY of these:  I do not feel good  Cough or wheeze  Chest feels tight  Wake up at night   Keep taking your Green Zone medications  Start taking your rescue medicine:  every 20 minutes for up to 1 hour. Then every 4 hours for 24-48 hours.  If you stay in the Yellow Zone for more than 12-24 hours, contact your doctor.  If you do not return to the Green Zone in 12-24 hours or you get worse, start taking your oral steroid medicine if prescribed by your provider.           RED ZONE Medical Alert - Get Help  I have ANY of these:  I feel awful  Medicine is not helping  Breathing getting harder  Trouble walking or talking  Nose opens wide to breathe       Take your rescue medicine NOW  If your provider has prescribed an oral steroid medicine, start taking it NOW  Call your doctor NOW  If you are still in the Red Zone after 20 minutes and you have not reached your doctor:  Take your rescue medicine again and  Call 911 or go to the emergency room right away    See your regular doctor within 2 weeks of an Emergency Room or Urgent Care visit for follow-up  treatment.          Annual Reminders:  Meet with Asthma Educator,  Flu Shot in the Fall, consider Pneumonia Vaccination for patients with asthma (aged 19 and older).    Pharmacy: [id number] - [place], [place] - [address]    Electronically signed by Marquez Aguayo PA-C   Date: 10/16/24                    Asthma Triggers  How To Control Things That Make Your Asthma Worse    Triggers are things that make your asthma worse.  Look at the list below to help you find your triggers and   what you can do about them. You can help prevent asthma flare-ups by staying away from your triggers.      Trigger                                                          What you can do   Cigarette Smoke  Tobacco smoke can make asthma worse. Do not allow smoking in your home, car or around you.  Be sure no one smokes at a child s day care or school.  If you smoke, ask your health care provider for ways to help you quit.  Ask family members to quit too.  Ask your health care provider for a referral to Quit Plan to help you quit smoking, or call 7-670-069-PLAN.     Colds, Flu, Bronchitis  These are common triggers of asthma. Wash your hands often.  Don t touch your eyes, nose or mouth.  Get a flu shot every year.     Dust Mites  These are tiny bugs that live in cloth or carpet. They are too small to see. Wash sheets and blankets in hot water every week.   Encase pillows and mattress in dust mite proof covers.  Avoid having carpet if you can. If you have carpet, vacuum weekly.   Use a dust mask and HEPA vacuum.   Pollen and Outdoor Mold  Some people are allergic to trees, grass, or weed pollen, or molds. Try to keep your windows closed.  Limit time out doors when pollen count is high.   Ask you health care provider about taking medicine during allergy season.     Animal Dander  Some people are allergic to skin flakes, urine or saliva from pets with fur or feathers. Keep pets with fur or feathers out of your home.     If you can t keep the pet outdoors, then keep the pet out of your bedroom.  Keep the bedroom door closed.  Keep pets off cloth furniture and away from stuffed toys.     Mice, Rats, and Cockroaches  Some people are allergic to the waste from these pests.   Cover food and garbage.  Clean up spills and food crumbs.  Store grease in the refrigerator.   Keep food out of the bedroom.   Indoor Mold  This can be a trigger if your home has high moisture. Fix leaking faucets, pipes, or other sources of water.   Clean moldy surfaces.  Dehumidify basement if it is damp and smelly.   Smoke, Strong Odors, and Sprays  These can reduce air quality. Stay away from strong odors and sprays, such as perfume, powder, hair spray, paints, smoke incense, paint, cleaning products, candles and new carpet.   Exercise or Sports  Some people with asthma have this trigger. Be active!  Ask your doctor about taking medicine before sports or exercise to prevent symptoms.    Warm up for 5-10 minutes before and after sports or exercise.     Other Triggers of Asthma  Cold air:  Cover your nose and mouth with a scarf.  Sometimes laughing or crying can be a trigger.  Some medicines and food can trigger asthma.

## 2024-10-16 NOTE — PROGRESS NOTES
"Preventive Care Visit  [facility] KIERSTEN Aguayo PA-C, Physician Assistant - Medical  Oct 16, 2024      Assessment & Plan     Routine history and physical examination of adult  completed    Cervical cancer screening  Completed   - HPV and Gynecologic Cytology Panel - Recommended Age 30 - 65 Years  Should complete yearly due to being on tamoxifen    Mild intermittent asthma, unspecified whether complicated  Stable   - albuterol (VENTOLIN HFA) 108 (90 Base) MCG/ACT inhaler; INHALE TWO PUFFS BY MOUTH EVERY 4 HOURS  - FLOVENT  MCG/ACT inhaler; Inhale 1 puff into the lungs 2 times daily.  Refill given    Migraine without aura and without status migrainosus, not intractable  Stable   - rizatriptan (MAXALT) 10 MG tablet; TAKE 1 TABLET AT ONSET OF HEADACHE. Can repeat dose 4 hours later if needed. Max 3/24 hours.  Refilled.  Getting rare headaches, abortive med working well    Moderate episode of recurrent major depressive disorder (H)  Improved  - citalopram (CELEXA) 40 MG tablet; TAKE 1 TABLET BY MOUTH DAILY  Refill without change    CARDIOVASCULAR SCREENING; LDL GOAL LESS THAN 130  recheck  - Lipid panel reflex to direct LDL Fasting; Future  - Lipid panel reflex to direct LDL Fasting    Cigarette nicotine dependence without complication  Did quit smoking for a month now   - nicotine (COMMIT) 2 MG lozenge; Place 1 lozenge (2 mg) inside cheek every hour as needed for nicotine withdrawal symptoms.  Will refill to have on hand    Lumbar pain  New, radiation to leg   - XR Lumbar Spine 2/3 Views; Future  Mild disc heigh loss with arthritic changes L4-S1   Suggest stretching daily, tylenol as needed.  PT if not improving    Malignant neoplasm of right female breast, unspecified estrogen receptor status, unspecified site of breast (H)  Per oncology           BMI  Estimated body mass index is 40.03 kg/m  as calculated from the following:    Height as of this encounter: 1.6 m (5' 3\").    Weight as of " this encounter: 102.5 kg (226 lb).   Weight management plan: Discussed healthy diet and exercise guidelines      Follow up yearly for prevent visits     Florentin Daigle is a 49 year old, presenting for the following:  Anxiety and Depression        10/16/2024    11:04 AM   Additional Questions   Roomed by James Ramirez MA   Accompanied by Spouse        Health Care Directive  Patient does not have a Health Care Directive or Living Will:     HPI          12/20/2023   Social Factors   Worry food won't last until get money to buy more No   Food not last or not have enough money for food? No   Do you have housing? (Housing is defined as stable permanent housing and does not include staying ouside in a car, in a tent, in an abandoned building, in an overnight shelter, or couch-surfing.) Yes   Are you worried about losing your housing? No   Lack of transportation? No   Unable to get utilities (heat,electricity)? No            Today's PHQ-9 Score:       10/15/2024     8:28 PM   PHQ-9 SCORE   PHQ-9 Total Score MyChart 5 (Mild depression)   PHQ-9 Total Score 5         10/15/2024   Substance Use   If I could quit smoking, I would Completely agree   I want to quit somking, worry about health affects Completely agree   Willing to make a plan to quit smoking Completely agree   Willing to cut down before quitting Completely agree        Social History     Tobacco Use    Smoking status: Every Day     Types: Cigarettes    Smokeless tobacco: Never   Vaping Use    Vaping status: Never Used           1/10/2024   LAST FHS-7 RESULTS   1st degree relative breast or ovarian cancer No   Any relative bilateral breast cancer No   Any male have breast cancer No   Any ONE woman have BOTH breast AND ovarian cancer No   Any woman with breast cancer before 50yrs No   2 or more relatives with breast AND/OR ovarian cancer No   2 or more relatives with breast AND/OR bowel cancer No           Mammogram Screening - Mammogram every 1-2 years updated in  "Health Maintenance based on mutual decision making      History of abnormal Pap smear: pap completed today       ASCVD Risk   The ASCVD Risk score (Aditya MELGOZA, et al., 2019) failed to calculate for the following reasons:    Cannot find a previous HDL lab    Cannot find a previous total cholesterol lab       Reviewed and updated as needed this visit by Provider   Tobacco  Allergies  Meds  Problems  Med Hx  Surg Hx  Fam Hx            History reviewed. No pertinent past medical history.  Past Surgical History:   Procedure Laterality Date    COLONOSCOPY N/A 2/20/2024    Procedure: COLONOSCOPY, WITH POLYPECTOMY AND BIOPSY;  Surgeon: Vita Victoria MD;  Location: [facility] OR    COLONOSCOPY WITH CO2 INSUFFLATION N/A 2/20/2024    Procedure: Colonoscopy with CO2 insufflation;  Surgeon: Vita Victoria MD;  Location: [facility] OR            Objective    Exam  /65   Pulse 71   Temp 98.3  F (36.8  C) (Tympanic)   Resp 16   Ht 1.6 m (5' 3\")   Wt 102.5 kg (226 lb)   SpO2 97%   Breastfeeding No   BMI 40.03 kg/m     Estimated body mass index is 40.03 kg/m  as calculated from the following:    Height as of this encounter: 1.6 m (5' 3\").    Weight as of this encounter: 102.5 kg (226 lb).    Physical Exam  GENERAL: alert and no distress  EYES: Eyes grossly normal to inspection, PERRL and conjunctivae and sclerae normal  NECK: no adenopathy, no asymmetry, masses, or scars  RESP: lungs clear to auscultation - no rales, rhonchi or wheezes  CV: regular rate and rhythm, normal S1 S2, no S3 or S4, no murmur, click or rub, no peripheral edema  ABDOMEN: soft, nontender, no hepatosplenomegaly, no masses and bowel sounds normal   (female): normal female external genitalia, normal urethral meatus, normal vaginal mucosa  MS: no gross musculoskeletal defects noted, no edema. Mild tenderness at L5 on exam, ROM intact. Sensation intact of legs, hips  PSYCH: mentation appears normal, affect normal/bright        Signed " Electronically by: Marquez Aguayo PA-C

## 2024-10-16 NOTE — LETTER
My Depression Action Plan  Name: Oxana Mathias   Date of Birth 1975  Date: 10/9/2024    My doctor: Marquez Aguayo   My clinic: [facility]  [address] [address] [phone]            GREEN    ZONE   Good Control    What it looks like:   Things are going generally well. You have normal ups and downs. You may even feel depressed from time to time, but bad moods usually last less than a day.   What you need to do:  Continue to care for yourself (see self care plan)  Check your depression survival kit and update it as needed  Follow your physician s recommendations including any medication.  Do not stop taking medication unless you consult with your physician first.             YELLOW         ZONE Getting Worse    What it looks like:   Depression is starting to interfere with your life.   It may be hard to get out of bed; you may be starting to isolate yourself from others.  Symptoms of depression are starting to last most all day and this has happened for several days.   You may have suicidal thoughts but they are not constant.   What you need to do:     Call your care team. Your response to treatment will improve if you keep your care team informed of your progress. Yellow periods are signs an adjustment may need to be made.     Continue your self-care.  Just get dressed and ready for the day.  Don't give yourself time to talk yourself out of it.    Talk to someone in your support network.    Open up your Depression Self-Care Plan/Wellness Kit.             RED    ZONE Medical Alert - Get Help    What it looks like:   Depression is seriously interfering with your life.   You may experience these or other symptoms: You can t get out of bed most days, can t work or engage in other necessary activities, you have trouble taking care of basic hygiene, or basic responsibilities, thoughts of suicide or death that will not go away, self-injurious behavior.      What you need to do:  Call your care team and request a same-day appointment. If they are not available (weekends or after hours) call your local crisis line, emergency room or 911.          Depression Self-Care Plan / Wellness Kit    Many people find that medication and therapy are helpful treatments for managing depression. In addition, making small changes to your everyday life can help to boost your mood and improve your wellbeing. Below are some tips for you to consider. Be sure to talk with your medical provider and/or behavioral health consultant if your symptoms are worsening or not improving.     Sleep   Sleep hygiene  means all of the habits that support good, restful sleep. It includes maintaining a consistent bedtime and wake time, using your bedroom only for sleeping or sex, and keeping the bedroom dark and free of distractions like a computer, smartphone, or television.     Develop a Healthy Routine  Maintain good hygiene. Get out of bed in the morning, make your bed, brush your teeth, take a shower, and get dressed. Don t spend too much time viewing media that makes you feel stressed. Find time to relax each day.    Exercise  Get some form of exercise every day. This will help reduce pain and release endorphins, the  feel good  chemicals in your brain. It can be as simple as just going for a walk or doing some gardening, anything that will get you moving.      Diet  Strive to eat healthy foods, including fruits and vegetables. Drink plenty of water. Avoid excessive sugar, caffeine, alcohol, and other mood-altering substances.     Stay Connected with Others  Stay in touch with friends and family members.    Manage Your Mood  Try deep breathing, massage therapy, biofeedback, or meditation. Take part in fun activities when you can. Try to find something to smile about each day.     Psychotherapy  Be open to working with a therapist if your provider recommends it.     Medication  Be sure to take your  medication as prescribed. Most anti-depressants need to be taken every day. It usually takes several weeks for medications to work. Not all medicines work for all people. It is important to follow-up with your provider to make sure you have a treatment plan that is working for you. Do not stop your medication abruptly without first discussing it with your provider.    Crisis Resources   These hotlines are for both adults and children. They and are open 24 hours a day, 7 days a week unless noted otherwise.    National Suicide Prevention Lifeline   988 or 1-350-890-AOGG (6311)    Crisis Text Line    www.crisistextline.org  Text HOME to 089122 from anywhere in the [place], anytime, about any type of crisis. A live, trained crisis counselor will receive the text and respond quickly.    [place] Lifeline for LGBTQ Youth  A national crisis intervention and suicide lifeline for LGBTQ youth under 25. Provides a safe place to talk without judgement. Call 1-563.246.8346; text START to 645388 or visit www.[employer] [facility].org to talk to a trained counselor.    For [facility] crisis numbers, visit the [facility] website at:  https://[place].gov/dhs/people-we-serve/adults/health-care/mental-health/resources/crisis-contacts.jsp

## 2024-10-17 ENCOUNTER — TELEPHONE (OUTPATIENT)
Dept: FAMILY MEDICINE | Facility: CLINIC | Age: 49
End: 2024-10-17
Payer: COMMERCIAL

## 2024-10-17 DIAGNOSIS — J45.20 MILD INTERMITTENT ASTHMA, UNSPECIFIED WHETHER COMPLICATED: ICD-10-CM

## 2024-10-17 DIAGNOSIS — J30.1 SEASONAL ALLERGIC RHINITIS DUE TO POLLEN: Primary | ICD-10-CM

## 2024-10-17 LAB
ANION GAP SERPL CALCULATED.3IONS-SCNC: 10 MMOL/L (ref 7–15)
BUN SERPL-MCNC: 13.5 MG/DL (ref 6–20)
CALCIUM SERPL-MCNC: 9.4 MG/DL (ref 8.8–10.4)
CHLORIDE SERPL-SCNC: 106 MMOL/L (ref 98–107)
CHOLEST SERPL-MCNC: 207 MG/DL
CREAT SERPL-MCNC: 0.77 MG/DL (ref 0.51–0.95)
EGFRCR SERPLBLD CKD-EPI 2021: >90 ML/MIN/1.73M2
FASTING STATUS PATIENT QL REPORTED: YES
FASTING STATUS PATIENT QL REPORTED: YES
GLUCOSE SERPL-MCNC: 87 MG/DL (ref 70–99)
HCO3 SERPL-SCNC: 26 MMOL/L (ref 22–29)
HDLC SERPL-MCNC: 40 MG/DL
HPV HR 12 DNA CVX QL NAA+PROBE: NEGATIVE
HPV16 DNA CVX QL NAA+PROBE: NEGATIVE
HPV18 DNA CVX QL NAA+PROBE: NEGATIVE
HUMAN PAPILLOMA VIRUS FINAL DIAGNOSIS: NORMAL
LDLC SERPL CALC-MCNC: 130 MG/DL
NONHDLC SERPL-MCNC: 167 MG/DL
POTASSIUM SERPL-SCNC: 4.3 MMOL/L (ref 3.4–5.3)
SODIUM SERPL-SCNC: 142 MMOL/L (ref 135–145)
TRIGL SERPL-MCNC: 183 MG/DL

## 2024-10-17 RX ORDER — FLUTICASONE PROPIONATE 50 MCG
1 SPRAY, SUSPENSION (ML) NASAL DAILY
Qty: 18.2 ML | Refills: 3 | Status: SHIPPED | OUTPATIENT
Start: 2024-10-17 | End: 2024-10-18

## 2024-10-17 NOTE — TELEPHONE ENCOUNTER
Pharmacy calling regarding new Rx sent in for Flonase.     Brand name is not in stock and they are wondering if they can get an order for generic instead.    Routing to provider to advise.  Coty Hester BSN, RN

## 2024-10-18 ENCOUNTER — TELEPHONE (OUTPATIENT)
Dept: FAMILY MEDICINE | Facility: CLINIC | Age: 49
End: 2024-10-18
Payer: COMMERCIAL

## 2024-10-18 RX ORDER — FLUTICASONE PROPIONATE 110 UG/1
1 AEROSOL, METERED RESPIRATORY (INHALATION) 2 TIMES DAILY
Qty: 12 G | Refills: 1 | Status: SHIPPED | OUTPATIENT
Start: 2024-10-18

## 2024-10-18 NOTE — TELEPHONE ENCOUNTER
Definitely talking about two different things here. I will discontinue the FLONASE as they meant to ask about FLOVENT. I will sign a second flovent script that is under generic     Marquez Aguayo PA-C

## 2024-10-18 NOTE — TELEPHONE ENCOUNTER
Pharmacy calling again because the Flonase inhaler was ordered as CRISTOPHER and pharmacy is wondering if they can give her generic as brand name is not in stock.    Then yesterday they received an order for Flonase nasal spray so they are wondering if provider wants the nasal spray filled instead of generic Flonase inhaler.     Routing to provider to clarify.    Coty Hester BSN, RN

## 2024-10-21 LAB
BKR AP ASSOCIATED HPV REPORT: NORMAL
BKR LAB AP GYN ADEQUACY: NORMAL
BKR LAB AP GYN INTERPRETATION: NORMAL
BKR LAB AP PREVIOUS ABNORMAL: NORMAL
PATH REPORT.COMMENTS IMP SPEC: NORMAL
PATH REPORT.COMMENTS IMP SPEC: NORMAL
PATH REPORT.RELEVANT HX SPEC: NORMAL

## 2025-01-05 NOTE — PROGRESS NOTES
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    Impression:  No clinical evidence of recurrent right localized ER positive breast cancer on tamoxifen    Recommendation, plan, instructions:  Continue tamoxifen 20 mg once daily; refilled  Follow-up with Dr. Frederick in 6 months    Time with patient including review, documentation, history, examination, coordination of care and counseling was 20 minutes.    History of present illness:  In July, 2021, this patient underwent a right lumpectomy/SLN for a 1.8 cm grade 2 ductal carcinoma that was ER/NV positive, HER2 FISH negative with clear margins, 1/7 positive lymph nodes and Oncotype Dx recurrence score of 23.    From 8/26/2021-10/28/2021, she received 4 cycles of adjuvant cyclophosphamide/docetaxel chemotherapy followed by radiation completed on 12/21/2021.    On 3/29/2022, she started tamoxifen 20 mg once daily.     1/10/2025 bilateral mammography revealed BI-RADS 2 changes.  1/10/2025 LH, FSH and estradiol revealed equivocal findings for postmenopausal state. (Estradiol 8, FSH 20.4, LH 14.2)  Studies also suggested equivocal menopausal status in January, 2024.    She still smokes about 5 cigarettes daily.  Does not use alcohol.  June, 2021 DEXA scan was said to have been done in [place], [place] but fax reports do not contain that actual study.    Past medical history:  No past medical history on file.     Family history:          Medications:  Current Outpatient Medications   Medication Sig Dispense Refill    albuterol (VENTOLIN HFA) 108 (90 Base) MCG/ACT inhaler INHALE TWO PUFFS BY MOUTH EVERY 4 HOURS 18 g 0    citalopram (CELEXA) 40 MG tablet TAKE 1 TABLET BY MOUTH DAILY 90 tablet 1    FLOVENT   "MCG/ACT inhaler Inhale 1 puff into the lungs 2 times daily. 12 g 11    fluticasone (FLOVENT HFA) 110 MCG/ACT inhaler Inhale 1 puff into the lungs 2 times daily. 12 g 1    nicotine (COMMIT) 2 MG lozenge Place 1 lozenge (2 mg) inside cheek every hour as needed for nicotine withdrawal symptoms. 24 lozenge 3    rizatriptan (MAXALT) 10 MG tablet TAKE 1 TABLET AT ONSET OF HEADACHE. Can repeat dose 4 hours later if needed. Max 3/24 hours. 30 tablet 3    tamoxifen (NOLVADEX) 20 MG tablet TAKE 1 TABLET BY MOUTH DAILY* 90 tablet 3     No current facility-administered medications for this visit.       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    Except as note above, the patient denies:  Headache, double vision, change in vision, hearing loss, tinnitus;  Dyspnea, chest pain, palpitations, pleurisy or hemoptysis;  Anorexia, nausea, vomiting, diarrhea, constipation, rectal bleeding bleeding, change in bowel habits;  Urinary frequency, burning or hematuria;  Fever, chills, sweats, change in appetite;  Bone or back pain; skin rash, joint swelling, new lumps;  Unexplained, bleeding or bruising, tingling numbness, change in gait;    Physical examination:  /82   Pulse 75   Resp 16   Ht 1.6 m (5' 2.99\")   Wt 106.6 kg (235 lb)   SpO2 98%   BMI 41.64 kg/m      The patient is alert and oriented.   Throat and oral mucosa are normal-appearing.   Adenopathy is absent in the neck, axilla, groin and supraclavicular fossa;   Lungs are clear to auscultation and percussion without rales, wheezes or rubs; Heart rhythm is regular, heart sounds are without murmurs or gallops  Bilateral breast exam reveals no palpable dominant masses;  Abdomen is soft and flat without hepatosplenomegaly, masses, ascites or tenderness;  Extremities and skin reveal no unusual skin lesions, joint " swelling or edema;      Devin Boyle MD

## 2025-01-10 ENCOUNTER — ANCILLARY PROCEDURE (OUTPATIENT)
Dept: MAMMOGRAPHY | Facility: CLINIC | Age: 50
End: 2025-01-10
Attending: INTERNAL MEDICINE
Payer: COMMERCIAL

## 2025-01-10 DIAGNOSIS — Z12.31 ENCOUNTER FOR SCREENING MAMMOGRAM FOR BREAST CANCER: ICD-10-CM

## 2025-01-10 PROCEDURE — 77067 SCR MAMMO BI INCL CAD: CPT | Performed by: RADIOLOGY

## 2025-01-10 PROCEDURE — 77063 BREAST TOMOSYNTHESIS BI: CPT | Performed by: RADIOLOGY

## 2025-01-16 ENCOUNTER — ONCOLOGY VISIT (OUTPATIENT)
Dept: ONCOLOGY | Facility: CLINIC | Age: 50
End: 2025-01-16
Attending: INTERNAL MEDICINE
Payer: COMMERCIAL

## 2025-01-16 VITALS
DIASTOLIC BLOOD PRESSURE: 82 MMHG | BODY MASS INDEX: 41.64 KG/M2 | HEART RATE: 75 BPM | RESPIRATION RATE: 16 BRPM | SYSTOLIC BLOOD PRESSURE: 112 MMHG | OXYGEN SATURATION: 98 % | HEIGHT: 63 IN | WEIGHT: 235 LBS

## 2025-01-16 DIAGNOSIS — C50.911 MALIGNANT NEOPLASM OF RIGHT FEMALE BREAST, UNSPECIFIED ESTROGEN RECEPTOR STATUS, UNSPECIFIED SITE OF BREAST (H): ICD-10-CM

## 2025-01-16 PROCEDURE — G0463 HOSPITAL OUTPT CLINIC VISIT: HCPCS | Performed by: INTERNAL MEDICINE

## 2025-01-16 RX ORDER — TAMOXIFEN CITRATE 20 MG/1
TABLET ORAL
Qty: 90 TABLET | Refills: 3 | Status: SHIPPED | OUTPATIENT
Start: 2025-01-16

## 2025-01-16 ASSESSMENT — PAIN SCALES - GENERAL: PAINLEVEL_OUTOF10: MODERATE PAIN (4)

## 2025-01-16 NOTE — PATIENT INSTRUCTIONS
Continue tamoxifen 20 mg once daily; Refilled  Follow-up Dr. Frederick in 6 months  Call or return in event of unexplained signs or symptoms

## 2025-01-16 NOTE — LETTER
1/16/2025      Ox[place] [facility]ias  [id number] [facility] [address]      Dear Colleague,    Thank you for referring your patient, Oxana Mathias, to the [facility]. Please see a copy of my visit note below.    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    Impression:  No clinical evidence of recurrent right localized ER positive breast cancer on tamoxifen    Recommendation, plan, instructions:  Continue tamoxifen 20 mg once daily; refilled  Follow-up with Dr. Frederick in 6 months    Time with patient including review, documentation, history, examination, coordination of care and counseling was 20 minutes.    History of present illness:  In July, 2021, this patient underwent a right lumpectomy/SLN for a 1.8 cm grade 2 ductal carcinoma that was ER/WV positive, HER2 FISH negative with clear margins, 1/7 positive lymph nodes and Oncotype Dx recurrence score of 23.    From 8/26/2021-10/28/2021, she received 4 cycles of adjuvant cyclophosphamide/docetaxel chemotherapy followed by radiation completed on 12/21/2021.    On 3/29/2022, she started tamoxifen 20 mg once daily.     1/10/2025 bilateral mammography revealed BI-RADS 2 changes.  1/10/2025 LH, FSH and estradiol revealed equivocal findings for postmenopausal state. (Estradiol 8, FSH 20.4, LH 14.2)  Studies also suggested equivocal menopausal status in January, 2024.    She still smokes about 5 cigarettes daily.  Does not use alcohol.  June, 2021 DEXA scan was said to have been done in [place], [place] but fax reports do not contain that actual study.    Past medical history:  No past medical history on file.     Family history:          Medications:  Current  "Outpatient Medications   Medication Sig Dispense Refill     albuterol (VENTOLIN HFA) 108 (90 Base) MCG/ACT inhaler INHALE TWO PUFFS BY MOUTH EVERY 4 HOURS 18 g 0     citalopram (CELEXA) 40 MG tablet TAKE 1 TABLET BY MOUTH DAILY 90 tablet 1     FLOVENT  MCG/ACT inhaler Inhale 1 puff into the lungs 2 times daily. 12 g 11     fluticasone (FLOVENT HFA) 110 MCG/ACT inhaler Inhale 1 puff into the lungs 2 times daily. 12 g 1     nicotine (COMMIT) 2 MG lozenge Place 1 lozenge (2 mg) inside cheek every hour as needed for nicotine withdrawal symptoms. 24 lozenge 3     rizatriptan (MAXALT) 10 MG tablet TAKE 1 TABLET AT ONSET OF HEADACHE. Can repeat dose 4 hours later if needed. Max 3/24 hours. 30 tablet 3     tamoxifen (NOLVADEX) 20 MG tablet TAKE 1 TABLET BY MOUTH DAILY* 90 tablet 3     No current facility-administered medications for this visit.       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    Except as note above, the patient denies:  Headache, double vision, change in vision, hearing loss, tinnitus;  Dyspnea, chest pain, palpitations, pleurisy or hemoptysis;  Anorexia, nausea, vomiting, diarrhea, constipation, rectal bleeding bleeding, change in bowel habits;  Urinary frequency, burning or hematuria;  Fever, chills, sweats, change in appetite;  Bone or back pain; skin rash, joint swelling, new lumps;  Unexplained, bleeding or bruising, tingling numbness, change in gait;    Physical examination:  /82   Pulse 75   Resp 16   Ht 1.6 m (5' 2.99\")   Wt 106.6 kg (235 lb)   SpO2 98%   BMI 41.64 kg/m      The patient is alert and oriented.   Throat and oral mucosa are normal-appearing.   Adenopathy is absent in the neck, axilla, groin and supraclavicular fossa;   Lungs are clear to auscultation and percussion without rales, wheezes or rubs; Heart rhythm is " regular, heart sounds are without murmurs or gallops  Bilateral breast exam reveals no palpable dominant masses;  Abdomen is soft and flat without hepatosplenomegaly, masses, ascites or tenderness;  Extremities and skin reveal no unusual skin lesions, joint swelling or edema;      Devin Boyle MD      Again, thank you for allowing me to participate in the care of your patient.        Sincerely,        Devin Boyle MD    Electronically signed

## 2025-01-16 NOTE — NURSING NOTE
"Oncology Rooming Note    January 16, 2025 10:36 AM   Oxana Mathias is a 49 year old female who presents for:    Chief Complaint   Patient presents with    Oncology Clinic Visit     Follow up     Initial Vitals: /82   Pulse 75   Resp 16   Ht 1.6 m (5' 2.99\")   Wt 106.6 kg (235 lb)   SpO2 98%   BMI 41.64 kg/m   Estimated body mass index is 41.64 kg/m  as calculated from the following:    Height as of this encounter: 1.6 m (5' 2.99\").    Weight as of this encounter: 106.6 kg (235 lb). Body surface area is 2.18 meters squared.  Moderate Pain (4) Comment: Data Unavailable   No LMP recorded. Patient is postmenopausal.  Allergies reviewed: Yes  Medications reviewed: Yes    Medications: Medication refills not needed today.  Pharmacy name entered into [facility]: [id number] [address] [facility]    Frailty Screening:   Is the patient here for a new oncology consult visit in cancer care? 2. No      Clinical concerns: No new concerns         Zaynab Barney LPN              "

## 2025-07-17 DIAGNOSIS — G43.009 MIGRAINE WITHOUT AURA AND WITHOUT STATUS MIGRAINOSUS, NOT INTRACTABLE: ICD-10-CM

## 2025-07-17 RX ORDER — RIZATRIPTAN BENZOATE 10 MG/1
TABLET ORAL
Qty: 30 TABLET | Refills: 1 | Status: SHIPPED | OUTPATIENT
Start: 2025-07-17

## (undated) DEVICE — PREP CHLORAPREP 26ML TINTED ORANGE  260815

## (undated) DEVICE — PAD CHUX UNDERPAD 23X24" 7136

## (undated) DEVICE — KIT ENDO FIRST STEP DISINFECTANT 200ML W/POUCH EP-4

## (undated) RX ORDER — FENTANYL CITRATE 50 UG/ML
INJECTION, SOLUTION INTRAMUSCULAR; INTRAVENOUS
Status: DISPENSED
Start: 2024-02-20